# Patient Record
Sex: MALE | Race: WHITE | NOT HISPANIC OR LATINO | Employment: OTHER | URBAN - METROPOLITAN AREA
[De-identification: names, ages, dates, MRNs, and addresses within clinical notes are randomized per-mention and may not be internally consistent; named-entity substitution may affect disease eponyms.]

---

## 2023-11-10 ENCOUNTER — TELEPHONE (OUTPATIENT)
Age: 63
End: 2023-11-10

## 2023-11-10 NOTE — TELEPHONE ENCOUNTER
Nurse Ulloa from [facility], [address] called in today to establish care for transitional care pt Addisonrosa Marquez who is still admitted with the hospital. He is on blood thinning medicine and has big blood clot on his right leg. Did a warm transfer to office clerical and they took the call.

## 2023-11-16 ENCOUNTER — OFFICE VISIT (OUTPATIENT)
Dept: FAMILY MEDICINE CLINIC | Facility: CLINIC | Age: 63
End: 2023-11-16

## 2023-11-16 VITALS
HEIGHT: 70 IN | HEART RATE: 90 BPM | OXYGEN SATURATION: 98 % | SYSTOLIC BLOOD PRESSURE: 122 MMHG | BODY MASS INDEX: 27.92 KG/M2 | WEIGHT: 195 LBS | RESPIRATION RATE: 14 BRPM | TEMPERATURE: 98.3 F | DIASTOLIC BLOOD PRESSURE: 82 MMHG

## 2023-11-16 DIAGNOSIS — I73.9 PERIPHERAL ARTERY DISEASE (HCC): ICD-10-CM

## 2023-11-16 DIAGNOSIS — I10 ESSENTIAL HYPERTENSION: ICD-10-CM

## 2023-11-16 DIAGNOSIS — I82.411 ACUTE DEEP VEIN THROMBOSIS (DVT) OF FEMORAL VEIN OF RIGHT LOWER EXTREMITY (HCC): Primary | ICD-10-CM

## 2023-11-16 DIAGNOSIS — J44.89 CHRONIC BRONCHITIS WITH EMPHYSEMA: ICD-10-CM

## 2023-11-16 DIAGNOSIS — Z59.89 DOES NOT HAVE HEALTH INSURANCE: ICD-10-CM

## 2023-11-16 DIAGNOSIS — E03.8 SUBCLINICAL HYPOTHYROIDISM: ICD-10-CM

## 2023-11-16 DIAGNOSIS — Z86.79 HISTORY OF CEREBRAL ANEURYSM: ICD-10-CM

## 2023-11-16 DIAGNOSIS — I71.43 INFRARENAL ABDOMINAL AORTIC ANEURYSM (AAA) WITHOUT RUPTURE (HCC): ICD-10-CM

## 2023-11-16 PROCEDURE — 99213 OFFICE O/P EST LOW 20 MIN: CPT | Performed by: STUDENT IN AN ORGANIZED HEALTH CARE EDUCATION/TRAINING PROGRAM

## 2023-11-16 RX ORDER — AMLODIPINE BESYLATE 5 MG/1
5 TABLET ORAL DAILY
COMMUNITY
Start: 2023-11-12 | End: 2023-12-12

## 2023-11-16 SDOH — ECONOMIC STABILITY - INCOME SECURITY: OTHER PROBLEMS RELATED TO HOUSING AND ECONOMIC CIRCUMSTANCES: Z59.89

## 2023-11-16 NOTE — PROGRESS NOTES
Clinic Visit Note  Meena Solano 61 y.o. male   MRN: [id number]    Assessment and Plan      Diagnoses and all orders for this visit:    Acute deep vein thrombosis (DVT) of femoral vein of right lower extremity (HCC)  Continue Eliquis, patient does have enough Eliquis for the next 60 days    Subclinical hypothyroidism  We will recheck TSH/free T4 once patient has insurance    Chronic bronchitis with emphysema  Asymptomatic in office today, will most likely need PFTs in future, patient notes that he has stopped smoking for over 1 week now, encourage tobacco cessation    Essential hypertension  Blood pressure stable 122/82 on low-dose amlodipine, continue    Peripheral artery disease (HCC)  Anticoagulation, will need to check routine labs once patient has insurance to determine if statin therapy is appropriate    History of cerebral aneurysm  No focal neurological deficits    Infrarenal abdominal aortic aneurysm (AAA) without rupture ([address])  Referral to vascular surgery once patient obtains insurance    Does not have health insurance  -     Ambulatory Referral to Social Work Care Management Program; Future    Other orders  -     amLODIPine (NORVASC) 5 mg tablet; Take 5 mg by mouth daily  -     apixaban (ELIQUIS) 5 mg; Take 5 mg by mouth 2 (two) times a day    My impressions and treatment recommendations were discussed in detail with the patient who verbalized understanding and had no further questions. Discharge instructions were provided. Subjective     Chief Complaint: NP    History of Present Illness:    Patient is a pleasant 60-year-old male. Remote medical history of hypertension, positive smoking, positive drinking. Right lower leg DVT, appears to be provoked driving, smoking. IV heparin transition to Eliquis on discharge. Unfortunately no insurance. Fusiform infrarenal abdominal aortic aneurysm measuring 4.8 cm with significant mural thrombus, will need vascular surgery outpatient.   CT imaging also showed hepatic steatosis. Chest x-ray imaging showing signs of emphysema. Subclinical hypothyroidism with elevated TSH, normal free T4. Cerebral aneurysm s/p coiling in 2007. The following portions of the patient's history were reviewed and updated as appropriate: allergies, current medications, past family history, past medical history, past social history, past surgical history and problem list.    REVIEW OF SYSTEMS:  A complete 12-point review of systems is negative other than that noted in the HPI. Review of Systems   Constitutional:  Negative for chills, fatigue and fever. HENT:  Negative for congestion and sore throat. Eyes:  Negative for pain and visual disturbance. Respiratory:  Negative for shortness of breath and wheezing. Cardiovascular:  Negative for chest pain and palpitations. Gastrointestinal:  Negative for abdominal pain, constipation, diarrhea, nausea and vomiting. Genitourinary:  Negative for dysuria and frequency. Musculoskeletal:  Negative for back pain and neck pain. Skin:  Negative for color change and rash. Neurological:  Negative for dizziness and headaches. Psychiatric/Behavioral:  Negative for agitation and confusion. All other systems reviewed and are negative.         Current Outpatient Medications:   •  amLODIPine (NORVASC) 5 mg tablet, Take 5 mg by mouth daily, Disp: , Rfl:   •  apixaban (ELIQUIS) 5 mg, Take 5 mg by mouth 2 (two) times a day, Disp: , Rfl:   Past Medical History:   Diagnosis Date   • Hypertension      Past Surgical History:   Procedure Laterality Date   • APPENDECTOMY     • CEREBRAL ANEURYSM REPAIR     • HERNIA REPAIR       Social History     Socioeconomic History   • Marital status: Unknown     Spouse name: Not on file   • Number of children: Not on file   • Years of education: Not on file   • Highest education level: Not on file   Occupational History   • Not on file   Tobacco Use   • Smoking status: Former     Packs/day: 2.00 Types: Cigarettes   • Smokeless tobacco: Former   Vaping Use   • Vaping Use: Former   Substance and Sexual Activity   • Alcohol use: Yes     Comment: social   • Drug use: Never   • Sexual activity: Not on file   Other Topics Concern   • Not on file   Social History Narrative   • Not on file     Social Determinants of Health     Financial Resource Strain: Not on file   Food Insecurity: Not on file   Transportation Needs: Not on file   Physical Activity: Not on file   Stress: Not on file   Social Connections: Not on file   Intimate Partner Violence: Not on file   Housing Stability: Not on file     Family History   Problem Relation Age of Onset   • Heart disease Mother    • Heart disease Father      No Known Allergies    Objective     Vitals:    11/16/23 1528   BP: 122/82   BP Location: Left arm   Patient Position: Sitting   Cuff Size: Adult   Pulse: 90   Resp: 14   Temp: 98.3 °F (36.8 °C)   TempSrc: Temporal   SpO2: 98%   Weight: 88.5 kg (195 lb)   Height: 5' 10" (1.778 m)       Physical Exam:     GENERAL: NAD, pleasant   HEENT:  NC/AT, PERRL, EOMI, no scleral icterus  CARDIAC:  RRR, +S1/S2, no S3/S4 appreciated, no m/g/r  PULMONARY:  CTA B/L, no wheezing/rales/rhonci, non-labored breathing  ABDOMEN:  Soft, NT/ND, no rebound/guarding/rigidity  Extremities:. No edema, cyanosis, or clubbing  Musculoskeletal:  Full range of motion intact in all extremities   NEUROLOGIC: Grossly intact, no focal deficits  SKIN:  No rashes or erythema noted on exposed skin  Psych: Normal affect, mood stable    Right leg greater than left with compression stockings on today in office    ==  PLEASE NOTE:  This encounter was completed utilizing the [employer] Direct Speech Voice Recognition Software. Grammatical errors, random word insertions, pronoun errors and incomplete sentences are occasional consequences of the system due to software limitations, ambient noise and hardware issues. These may be missed by proof reading prior to affixing electronic signature. Any questions or concerns about the content, text or information contained within the body of this dictation should be directly addressed to the physician for clarification. Please do not hesitate to call me directly if you have any any questions or concerns.     DO Jagdeep Aguila [facility]   [facility]

## 2023-11-20 ENCOUNTER — PATIENT OUTREACH (OUTPATIENT)
Dept: FAMILY MEDICINE CLINIC | Facility: CLINIC | Age: 63
End: 2023-11-20

## 2023-11-20 NOTE — PROGRESS NOTES
TL CERVANTES received referral for patient due to lack of health insurance. TL CERVANTES reviewed patient's chart. He was prescribed Eliquis, however has no health insurance at this time and is running out of samples. TL CERVANTES reached out to patient's home phone of which is not currently a working phone number. TL CERVANTES reached out to patient's mobile number and left a voicemail.

## 2023-11-27 DIAGNOSIS — I82.411 ACUTE DEEP VEIN THROMBOSIS (DVT) OF FEMORAL VEIN OF RIGHT LOWER EXTREMITY (HCC): Primary | ICD-10-CM

## 2023-11-27 NOTE — TELEPHONE ENCOUNTER
Reason for call:   [x] Refill   [] Prior Auth  [] Other:     Office:   [x] PCP/Provider -   [] Specialty/Provider -      Medication :apixaban (ELIQUIS) 5 mg [de-identified]  Order Details  Dose: 5 mg Route: Oral Frequency: 2 times daily  Dispense Quantity: -- Refills: --        Sig: Take 5 mg by mouth 2 (two) times a day    Does the patient have enough for 3 days?    [x] Yes   [] No - Send as HP to POD       Pharmacy [address] [place] [address]

## 2023-11-28 ENCOUNTER — PATIENT OUTREACH (OUTPATIENT)
Dept: FAMILY MEDICINE CLINIC | Facility: CLINIC | Age: 63
End: 2023-11-28

## 2023-11-28 NOTE — PROGRESS NOTES
TL CERVANTES received voicemail from patient returning call to [facility]. Patient states that he was recently approved for Social Security, but has not received it just yet. His daughter applying for insurance on his behalf through [facility] [employer]. TL CERVANTES encouraged him to provide her with  contact information if she has any questions or concerns in the process. Patient also inquired about Eliquis. His daughter has the 30 day free trial card. TL CERVANTES placed the [facility] PAP along with the $10 copay card information for once he has insurance in the mail per his request.    Plan to f/u within 2 weeks to ensure mail is received.

## 2023-12-11 ENCOUNTER — TELEPHONE (OUTPATIENT)
Age: 63
End: 2023-12-11

## 2023-12-11 DIAGNOSIS — I10 ESSENTIAL HYPERTENSION: Primary | ICD-10-CM

## 2023-12-11 RX ORDER — AMLODIPINE BESYLATE 5 MG/1
5 TABLET ORAL DAILY
Qty: 30 TABLET | Refills: 0 | Status: SHIPPED | OUTPATIENT
Start: 2023-12-11 | End: 2024-01-10

## 2023-12-11 NOTE — TELEPHONE ENCOUNTER
Dr. Borja Addis:    Spoke w/patient's daughter patient did take 1 dose this morning and will have enough for 3 more doses of medication. I told her that we were going to work on it on our end. She said she left message for  from hospital to see if she can give them a coupon that is not .

## 2023-12-11 NOTE — TELEPHONE ENCOUNTER
Reason for call:   [x] Refill   [] Prior Auth  [] Other:     Office:   [x] PCP/Provider -   [] Specialty/Provider -     Medication: Norvasc    Dose/Frequency: 5 mg     Quantity:     Pharmacy: [facility]    Does the patient have enough for 3 days?    [] Yes   [x] No - Send as HP to POD

## 2023-12-11 NOTE — TELEPHONE ENCOUNTER
Pt is taking Eliquis for a DVT that was diagnosed in 11/2023. Pt was given a coupon card at the hospital so he could get the medication at a discounted rate, but the coupon is not working. Pt is out of his Eliquis as of today. Called office and spoke to Theodosia. Office does not give out samples and there are no coupon cards in the office. Pt's daughter aware. Recommended daughter try [facility] or attempt to contact the  at the hospital that originally gave pt the coupon card. Pt does not have a cardiologist that he can contact. Daughter will try these suggestions. Please make Dr. Michelle Carrasco aware that pt will be out of Eliquis as of today.

## 2023-12-12 NOTE — TELEPHONE ENCOUNTER
Patients daughter called for an update on Elliquis. I saw Dr. Marc Avila message in chart and transferred patients daughter Ubaldo Doran to [facility] 1330 clinical POD for update.

## 2023-12-12 NOTE — TELEPHONE ENCOUNTER
Patient daughter returned call. They did reach out to case management at the hospital, they cannot help.   And he does not have a cardiologist.  He does not have insurance either,

## 2023-12-27 ENCOUNTER — PATIENT OUTREACH (OUTPATIENT)
Dept: FAMILY MEDICINE CLINIC | Facility: CLINIC | Age: 63
End: 2023-12-27

## 2023-12-27 NOTE — PROGRESS NOTES
TL CERVANTES reached out to patient for f/u regarding [employer] PAP that as previously sent in the mail.  TL CERVANTES left a voicemail.  Plan to f/u in 1 week

## 2024-01-04 ENCOUNTER — PATIENT OUTREACH (OUTPATIENT)
Dept: FAMILY MEDICINE CLINIC | Facility: CLINIC | Age: 64
End: 2024-01-04

## 2024-01-04 NOTE — PROGRESS NOTES
OPCM SW attempted second outreach to ensure patient received his PAP.  Patient confirmed he received the application and will have his daughter assist.    No further need for SW assistance at this time per discussion, as his daughter is helping him with his insurance concerns.

## 2024-01-09 ENCOUNTER — OFFICE VISIT (OUTPATIENT)
Dept: FAMILY MEDICINE CLINIC | Facility: CLINIC | Age: 64
End: 2024-01-09

## 2024-01-09 VITALS
WEIGHT: 210 LBS | RESPIRATION RATE: 12 BRPM | DIASTOLIC BLOOD PRESSURE: 80 MMHG | OXYGEN SATURATION: 98 % | BODY MASS INDEX: 30.13 KG/M2 | HEART RATE: 88 BPM | TEMPERATURE: 98.4 F | SYSTOLIC BLOOD PRESSURE: 130 MMHG

## 2024-01-09 DIAGNOSIS — Z86.79 HISTORY OF CEREBRAL ANEURYSM: ICD-10-CM

## 2024-01-09 DIAGNOSIS — I73.9 PERIPHERAL ARTERY DISEASE (HCC): ICD-10-CM

## 2024-01-09 DIAGNOSIS — I82.411 ACUTE DEEP VEIN THROMBOSIS (DVT) OF FEMORAL VEIN OF RIGHT LOWER EXTREMITY (HCC): Primary | ICD-10-CM

## 2024-01-09 DIAGNOSIS — I10 ESSENTIAL HYPERTENSION: ICD-10-CM

## 2024-01-09 DIAGNOSIS — J44.89 CHRONIC BRONCHITIS WITH EMPHYSEMA: ICD-10-CM

## 2024-01-09 DIAGNOSIS — E03.8 SUBCLINICAL HYPOTHYROIDISM: ICD-10-CM

## 2024-01-09 DIAGNOSIS — I71.43 INFRARENAL ABDOMINAL AORTIC ANEURYSM (AAA) WITHOUT RUPTURE (HCC): ICD-10-CM

## 2024-01-09 PROCEDURE — 99213 OFFICE O/P EST LOW 20 MIN: CPT | Performed by: STUDENT IN AN ORGANIZED HEALTH CARE EDUCATION/TRAINING PROGRAM

## 2024-01-09 RX ORDER — AMLODIPINE BESYLATE 5 MG/1
5 TABLET ORAL DAILY
Qty: 90 TABLET | Refills: 1 | Status: SHIPPED | OUTPATIENT
Start: 2024-01-09 | End: 2024-07-07

## 2024-01-09 NOTE — PROGRESS NOTES
Clinic Visit Note  Erich Barcenas 63 y.o. male   MRN: [id number]    Assessment and Plan      Diagnoses and all orders for this visit:    Acute deep vein thrombosis (DVT) of femoral vein of right lower extremity (HCC)  Continue Eliquis anticoagulation, right lower extremity swelling improvement    Subclinical hypothyroidism  Recheck once patient has insurance    Chronic bronchitis with emphysema  Asymptomatic at this time, continue tobacco cessation    Essential hypertension  Blood pressure appropriate on amlodipine, refill  -     amLODIPine (NORVASC) 5 mg tablet; Take 1 tablet (5 mg total) by mouth daily    Peripheral artery disease (HCC)  Follow-up blood work once patient has insurance    Infrarenal abdominal aortic aneurysm (AAA) without rupture (HCC)  Referral to vascular surgery once patient obtains insurance    History of cerebral aneurysm  No focal neurological deficits on exam today    My impressions and treatment recommendations were discussed in detail with the patient who verbalized understanding and had no further questions.  Discharge instructions were provided.    Subjective     Chief Complaint: F/U    History of Present Illness:    Patient is a pleasant 63-year-old male coming in for follow-up visit on chronic disease management, right leg swelling improvement, ambulatory motion improving.    The following portions of the patient's history were reviewed and updated as appropriate: allergies, current medications, past family history, past medical history, past social history, past surgical history and problem list.    REVIEW OF SYSTEMS:  A complete 12-point review of systems is negative other than that noted in the HPI.    Review of Systems   Constitutional:  Negative for chills and fever.   HENT:  Negative for ear pain and sore throat.    Eyes:  Negative for pain and visual disturbance.   Respiratory:  Negative for cough and shortness of breath.    Cardiovascular:  Positive for leg swelling. Negative for  chest pain and palpitations.   Gastrointestinal:  Negative for abdominal pain and vomiting.   Genitourinary:  Negative for dysuria and hematuria.   Musculoskeletal:  Negative for arthralgias and back pain.   Skin:  Negative for color change and rash.   Neurological:  Negative for seizures and syncope.   All other systems reviewed and are negative.        Current Outpatient Medications:   •  amLODIPine (NORVASC) 5 mg tablet, Take 1 tablet (5 mg total) by mouth daily, Disp: 90 tablet, Rfl: 1  •  apixaban (ELIQUIS) 5 mg, Take 1 tablet (5 mg total) by mouth 2 (two) times a day, Disp: 60 tablet, Rfl: 5  Past Medical History:   Diagnosis Date   • Hypertension      Past Surgical History:   Procedure Laterality Date   • APPENDECTOMY     • CEREBRAL ANEURYSM REPAIR     • HERNIA REPAIR       Social History     Socioeconomic History   • Marital status: Unknown     Spouse name: Not on file   • Number of children: Not on file   • Years of education: Not on file   • Highest education level: Not on file   Occupational History   • Not on file   Tobacco Use   • Smoking status: Former     Current packs/day: 2.00     Types: Cigarettes   • Smokeless tobacco: Former   Vaping Use   • Vaping status: Former   Substance and Sexual Activity   • Alcohol use: Yes     Comment: social   • Drug use: Never   • Sexual activity: Not on file   Other Topics Concern   • Not on file   Social History Narrative   • Not on file     Social Determinants of Health     Financial Resource Strain: Not on file   Food Insecurity: Not on file   Transportation Needs: Not on file   Physical Activity: Not on file   Stress: Not on file   Social Connections: Not on file   Intimate Partner Violence: Not on file   Housing Stability: Not on file     Family History   Problem Relation Age of Onset   • Heart disease Mother    • Heart disease Father      No Known Allergies    Objective     Vitals:    01/09/24 1352   BP: 130/80   Pulse: 88   Resp: 12   Temp: 98.4 °F (36.9 °C)    SpO2: 98%   Weight: 95.3 kg (210 lb)       Physical Exam:     GENERAL: NAD, pleasant   HEENT:  NC/AT, PERRL, EOMI, no scleral icterus  CARDIAC:  RRR, +S1/S2, no S3/S4 appreciated, no m/g/r  PULMONARY:  CTA B/L, no wheezing/rales/rhonci, non-labored breathing  ABDOMEN:  Soft, NT/ND, no rebound/guarding/rigidity  Extremities:. No edema, cyanosis, or clubbing  Musculoskeletal:  Full range of motion intact in all extremities   NEUROLOGIC: Grossly intact, no focal deficits  SKIN:  No rashes or erythema noted on exposed skin  Psych: Normal affect, mood stable    Right leg swelling has now improved, sensation, vascular, muscle strength intact    ==  PLEASE NOTE:  This encounter was completed utilizing the [employer] Direct Speech Voice Recognition Software. Grammatical errors, random word insertions, pronoun errors and incomplete sentences are occasional consequences of the system due to software limitations, ambient noise and hardware issues.These may be missed by proof reading prior to affixing electronic signature. Any questions or concerns about the content, text or information contained within the body of this dictation should be directly addressed to the physician for clarification. Please do not hesitate to call me directly if you have any any questions or concerns.    James Queen, DO  [facility]   [facility]

## 2024-03-01 ENCOUNTER — OFFICE VISIT (OUTPATIENT)
Dept: FAMILY MEDICINE CLINIC | Facility: CLINIC | Age: 64
End: 2024-03-01
Payer: COMMERCIAL

## 2024-03-01 VITALS
BODY MASS INDEX: 31.21 KG/M2 | HEART RATE: 92 BPM | SYSTOLIC BLOOD PRESSURE: 134 MMHG | HEIGHT: 70 IN | TEMPERATURE: 98.6 F | DIASTOLIC BLOOD PRESSURE: 78 MMHG | RESPIRATION RATE: 14 BRPM | WEIGHT: 218 LBS | OXYGEN SATURATION: 96 %

## 2024-03-01 DIAGNOSIS — J44.89 CHRONIC BRONCHITIS WITH EMPHYSEMA: ICD-10-CM

## 2024-03-01 DIAGNOSIS — I10 ESSENTIAL HYPERTENSION: ICD-10-CM

## 2024-03-01 DIAGNOSIS — E87.8 ELECTROLYTE ABNORMALITY: ICD-10-CM

## 2024-03-01 DIAGNOSIS — Z86.79 HISTORY OF CEREBRAL ANEURYSM: ICD-10-CM

## 2024-03-01 DIAGNOSIS — Z13.0 SCREENING, IRON DEFICIENCY ANEMIA: ICD-10-CM

## 2024-03-01 DIAGNOSIS — Z13.220 SCREENING CHOLESTEROL LEVEL: ICD-10-CM

## 2024-03-01 DIAGNOSIS — Z12.5 PROSTATE CANCER SCREENING: ICD-10-CM

## 2024-03-01 DIAGNOSIS — E03.8 SUBCLINICAL HYPOTHYROIDISM: ICD-10-CM

## 2024-03-01 DIAGNOSIS — I73.9 PERIPHERAL ARTERY DISEASE (HCC): ICD-10-CM

## 2024-03-01 DIAGNOSIS — I71.43 INFRARENAL ABDOMINAL AORTIC ANEURYSM (AAA) WITHOUT RUPTURE (HCC): ICD-10-CM

## 2024-03-01 DIAGNOSIS — Z13.1 DIABETES MELLITUS SCREENING: ICD-10-CM

## 2024-03-01 DIAGNOSIS — I82.411 ACUTE DEEP VEIN THROMBOSIS (DVT) OF FEMORAL VEIN OF RIGHT LOWER EXTREMITY (HCC): Primary | ICD-10-CM

## 2024-03-01 PROCEDURE — 99214 OFFICE O/P EST MOD 30 MIN: CPT | Performed by: STUDENT IN AN ORGANIZED HEALTH CARE EDUCATION/TRAINING PROGRAM

## 2024-03-01 NOTE — PROGRESS NOTES
Clinic Visit Note  Erich Barcenas 63 y.o. male   MRN: [id number]    Assessment and Plan      Diagnoses and all orders for this visit:    Acute deep vein thrombosis (DVT) of femoral vein of right lower extremity (HCC)  Right lower extremity improvement on swelling, continue compression stockings, Eliquis 5 mg twice daily refilled to pharmacy.  -     apixaban (ELIQUIS) 5 mg; Take 1 tablet (5 mg total) by mouth 2 (two) times a day    Infrarenal abdominal aortic aneurysm (AAA) without rupture (HCC)  -     Ambulatory Referral to Vascular Surgery; Future    Essential hypertension  Blood pressure appropriate in office today, continue amlodipine    Peripheral artery disease (HCC)    History of cerebral aneurysm    Chronic bronchitis with emphysema  Lungs clear to auscultation bilaterally    Subclinical hypothyroidism  -     TSH, 3rd generation; Future  -     T4, free; Future  -     TSH, 3rd generation  -     T4, free    Prostate cancer screening  -     PSA, total and free; Future  -     PSA, total and free    Screening cholesterol level  -     Lipid panel; Future  -     Lipid panel    Screening, iron deficiency anemia  -     CBC and differential; Future  -     CBC and differential    Electrolyte abnormality  -     Comprehensive metabolic panel; Future  -     Comprehensive metabolic panel    Diabetes mellitus screening  -     Hemoglobin A1C; Future  -     Hemoglobin A1C    My impressions and treatment recommendations were discussed in detail with the patient who verbalized understanding and had no further questions.  Discharge instructions were provided.    Subjective     Chief Complaint: Follow-up visit    History of Present Illness:    Patient is a pleasant 63-year-old male coming in for follow-up visit on chronic disease management, patient now has obtained insurance and we will obtain yearly blood work.    The following portions of the patient's history were reviewed and updated as appropriate: allergies, current  medications, past family history, past medical history, past social history, past surgical history and problem list.    REVIEW OF SYSTEMS:  A complete 12-point review of systems is negative other than that noted in the HPI.    Review of Systems   Constitutional:  Negative for chills, fatigue and fever.   HENT:  Negative for congestion and sore throat.    Eyes:  Negative for pain and visual disturbance.   Respiratory:  Negative for shortness of breath and wheezing.    Cardiovascular:  Negative for chest pain and palpitations.   Gastrointestinal:  Negative for abdominal pain, constipation, diarrhea, nausea and vomiting.   Genitourinary:  Negative for dysuria and frequency.   Musculoskeletal:  Negative for back pain and neck pain.   Skin:  Negative for color change and rash.   Neurological:  Negative for dizziness and headaches.   Psychiatric/Behavioral:  Negative for agitation and confusion.    All other systems reviewed and are negative.        Current Outpatient Medications:   •  amLODIPine (NORVASC) 5 mg tablet, Take 1 tablet (5 mg total) by mouth daily, Disp: 90 tablet, Rfl: 1  •  apixaban (ELIQUIS) 5 mg, Take 1 tablet (5 mg total) by mouth 2 (two) times a day, Disp: 60 tablet, Rfl: 5  Past Medical History:   Diagnosis Date   • Hypertension      Past Surgical History:   Procedure Laterality Date   • APPENDECTOMY     • CEREBRAL ANEURYSM REPAIR     • HERNIA REPAIR       Social History     Socioeconomic History   • Marital status: Unknown     Spouse name: Not on file   • Number of children: Not on file   • Years of education: Not on file   • Highest education level: Not on file   Occupational History   • Not on file   Tobacco Use   • Smoking status: Former     Current packs/day: 2.00     Types: Cigarettes   • Smokeless tobacco: Former   Vaping Use   • Vaping status: Former   Substance and Sexual Activity   • Alcohol use: Yes     Comment: social   • Drug use: Never   • Sexual activity: Not on file   Other Topics  "Concern   • Not on file   Social History Narrative   • Not on file     Social Determinants of Health     Financial Resource Strain: Not on file   Food Insecurity: Not on file   Transportation Needs: Not on file   Physical Activity: Not on file   Stress: Not on file   Social Connections: Not on file   Intimate Partner Violence: Not on file   Housing Stability: Not on file     Family History   Problem Relation Age of Onset   • Heart disease Mother    • Heart disease Father      No Known Allergies    Objective     Vitals:    03/01/24 1313   BP: 134/78   BP Location: Left arm   Patient Position: Sitting   Cuff Size: Standard   Pulse: 92   Resp: 14   Temp: 98.6 °F (37 °C)   TempSrc: Temporal   SpO2: 96%   Weight: 98.9 kg (218 lb)   Height: 5' 10\" (1.778 m)       Physical Exam:     GENERAL: NAD, pleasant   HEENT:  NC/AT, PERRL, EOMI, no scleral icterus  CARDIAC:  RRR, +S1/S2, no S3/S4 appreciated, no m/g/r  PULMONARY:  CTA B/L, no wheezing/rales/rhonci, non-labored breathing  ABDOMEN:  Soft, NT/ND, no rebound/guarding/rigidity  Extremities:. No edema, cyanosis, or clubbing  Musculoskeletal:  Full range of motion intact in all extremities   NEUROLOGIC: Grossly intact, no focal deficits  SKIN:  No rashes or erythema noted on exposed skin  Psych: Normal affect, mood stable    ==  PLEASE NOTE:  This encounter was completed utilizing the [employer] Direct Speech Voice Recognition Software. Grammatical errors, random word insertions, pronoun errors and incomplete sentences are occasional consequences of the system due to software limitations, ambient noise and hardware issues.These may be missed by proof reading prior to affixing electronic signature. Any questions or concerns about the content, text or information contained within the body of this dictation should be directly addressed to the physician for clarification. Please do not hesitate to call me directly if you have any any questions or concerns.    James" DO Bret  [facility]   [facility]

## 2024-06-25 DIAGNOSIS — I10 ESSENTIAL HYPERTENSION: ICD-10-CM

## 2024-06-25 RX ORDER — AMLODIPINE BESYLATE 5 MG/1
5 TABLET ORAL DAILY
Qty: 90 TABLET | Refills: 0 | Status: SHIPPED | OUTPATIENT
Start: 2024-06-25

## 2024-09-28 ENCOUNTER — TELEPHONE (OUTPATIENT)
Dept: OTHER | Facility: OTHER | Age: 64
End: 2024-09-28

## 2024-09-28 NOTE — TELEPHONE ENCOUNTER
"Pt's daughter stated, \" I would like to know what the availability is so I can get my father scheduled for an appointment.\"    Please follow up,thank you.  "

## 2024-10-15 ENCOUNTER — OFFICE VISIT (OUTPATIENT)
Dept: FAMILY MEDICINE CLINIC | Facility: CLINIC | Age: 64
End: 2024-10-15
Payer: COMMERCIAL

## 2024-10-15 ENCOUNTER — TELEPHONE (OUTPATIENT)
Dept: OTHER | Facility: OTHER | Age: 64
End: 2024-10-15

## 2024-10-15 VITALS
WEIGHT: 234 LBS | HEIGHT: 70 IN | HEART RATE: 121 BPM | RESPIRATION RATE: 18 BRPM | OXYGEN SATURATION: 96 % | TEMPERATURE: 98.5 F | BODY MASS INDEX: 33.5 KG/M2 | DIASTOLIC BLOOD PRESSURE: 82 MMHG | SYSTOLIC BLOOD PRESSURE: 158 MMHG

## 2024-10-15 DIAGNOSIS — Z86.718 HISTORY OF DVT (DEEP VEIN THROMBOSIS): ICD-10-CM

## 2024-10-15 DIAGNOSIS — Z00.00 ANNUAL PHYSICAL EXAM: Primary | ICD-10-CM

## 2024-10-15 DIAGNOSIS — E03.8 SUBCLINICAL HYPOTHYROIDISM: ICD-10-CM

## 2024-10-15 DIAGNOSIS — I71.43 INFRARENAL ABDOMINAL AORTIC ANEURYSM (AAA) WITHOUT RUPTURE (HCC): ICD-10-CM

## 2024-10-15 DIAGNOSIS — Z86.79 HISTORY OF CEREBRAL ANEURYSM: ICD-10-CM

## 2024-10-15 DIAGNOSIS — I10 ESSENTIAL HYPERTENSION: ICD-10-CM

## 2024-10-15 DIAGNOSIS — J44.89 CHRONIC BRONCHITIS WITH EMPHYSEMA (HCC): ICD-10-CM

## 2024-10-15 DIAGNOSIS — I73.9 PERIPHERAL ARTERY DISEASE (HCC): ICD-10-CM

## 2024-10-15 PROCEDURE — 99173 VISUAL ACUITY SCREEN: CPT | Performed by: STUDENT IN AN ORGANIZED HEALTH CARE EDUCATION/TRAINING PROGRAM

## 2024-10-15 PROCEDURE — 99214 OFFICE O/P EST MOD 30 MIN: CPT | Performed by: STUDENT IN AN ORGANIZED HEALTH CARE EDUCATION/TRAINING PROGRAM

## 2024-10-15 PROCEDURE — 99396 PREV VISIT EST AGE 40-64: CPT | Performed by: STUDENT IN AN ORGANIZED HEALTH CARE EDUCATION/TRAINING PROGRAM

## 2024-10-15 RX ORDER — AMLODIPINE BESYLATE 5 MG/1
5 TABLET ORAL DAILY
Qty: 90 TABLET | Refills: 3 | Status: SHIPPED | OUTPATIENT
Start: 2024-10-15

## 2024-10-15 RX ORDER — LOSARTAN POTASSIUM 50 MG/1
50 TABLET ORAL DAILY
Qty: 90 TABLET | Refills: 3 | Status: SHIPPED | OUTPATIENT
Start: 2024-10-15

## 2024-10-15 NOTE — PATIENT INSTRUCTIONS
"Patient Education     Routine physical for adults   The Basics   Written by the doctors and editors at [facility]   What is a physical? -- A physical is a routine visit, or \"check-up,\" with your doctor. You might also hear it called a \"wellness visit\" or \"preventive visit.\"  During each visit, the doctor will:   Ask about your physical and mental health   Ask about your habits, behaviors, and lifestyle   Do an exam   Give you vaccines if needed   Talk to you about any medicines you take   Give advice about your health   Answer your questions  Getting regular check-ups is an important part of taking care of your health. It can help your doctor find and treat any problems you have. But it's also important for preventing health problems.  A routine physical is different from a \"sick visit.\" A sick visit is when you see a doctor because of a health concern or problem. Since physicals are scheduled ahead of time, you can think about what you want to ask the doctor.  How often should I get a physical? -- It depends on your age and health. In general, for people age 21 years and older:   If you are younger than 50 years, you might be able to get a physical every 3 years.   If you are 50 years or older, your doctor might recommend a physical every year.  If you have an ongoing health condition, like diabetes or high blood pressure, your doctor will probably want to see you more often.  What happens during a physical? -- In general, each visit will include:   Physical exam - The doctor or nurse will check your height, weight, heart rate, and blood pressure. They will also look at your eyes and ears. They will ask about how you are feeling and whether you have any symptoms that bother you.   Medicines - It's a good idea to bring a list of all the medicines you take to each doctor visit. Your doctor will talk to you about your medicines and answer any questions. Tell them if you are having any side effects that bother you. You " "should also tell them if you are having trouble paying for any of your medicines.   Habits and behaviors - This includes:   Your diet   Your exercise habits   Whether you smoke, drink alcohol, or use drugs   Whether you are sexually active   Whether you feel safe at home  Your doctor will talk to you about things you can do to improve your health and lower your risk of health problems. They will also offer help and support. For example, if you want to quit smoking, they can give you advice and might prescribe medicines. If you want to improve your diet or get more physical activity, they can help you with this, too.   Lab tests, if needed - The tests you get will depend on your age and situation. For example, your doctor might want to check your:   Cholesterol   Blood sugar   Iron level   Vaccines - The recommended vaccines will depend on your age, health, and what vaccines you already had. Vaccines are very important because they can prevent certain serious or deadly infections.   Discussion of screening - \"Screening\" means checking for diseases or other health problems before they cause symptoms. Your doctor can recommend screening based on your age, risk, and preferences. This might include tests to check for:   Cancer, such as breast, prostate, cervical, ovarian, colorectal, prostate, lung, or skin cancer   Sexually transmitted infections, such as chlamydia and gonorrhea   Mental health conditions like depression and anxiety  Your doctor will talk to you about the different types of screening tests. They can help you decide which screenings to have. They can also explain what the results might mean.   Answering questions - The physical is a good time to ask the doctor or nurse questions about your health. If needed, they can refer you to other doctors or specialists, too.  Adults older than 65 years often need other care, too. As you get older, your doctor will talk to you about:   How to prevent falling at " home   Hearing or vision tests   Memory testing   How to take your medicines safely   Making sure that you have the help and support you need at home  All topics are updated as new evidence becomes available and our peer review process is complete.  This topic retrieved from [employer] on: May 02, 2024.  Topic 989763 Version 1.0  Release: 32.4.3 - C32.122  © 2024 UpToDate, Inc. and/or its affiliates. All rights reserved.  Consumer Information Use and Disclaimer   Disclaimer: This generalized information is a limited summary of diagnosis, treatment, and/or medication information. It is not meant to be comprehensive and should be used as a tool to help the user understand and/or assess potential diagnostic and treatment options. It does NOT include all information about conditions, treatments, medications, side effects, or risks that may apply to a specific patient. It is not intended to be medical advice or a substitute for the medical advice, diagnosis, or treatment of a health care provider based on the health care provider's examination and assessment of a patient's specific and unique circumstances. Patients must speak with a health care provider for complete information about their health, medical questions, and treatment options, including any risks or benefits regarding use of medications. This information does not endorse any treatments or medications as safe, effective, or approved for treating a specific patient. UpToDate, Inc. and its affiliates disclaim any warranty or liability relating to this information or the use thereof.The use of this information is governed by the Terms of Use, available at https://www.woltersSecurActiveuwer.com/en/know/clinical-effectiveness-terms. 2024© UpToDate, Inc. and its affiliates and/or licensors. All rights reserved.  Copyright   © 2024 UpToDate, Inc. and/or its affiliates. All rights reserved.

## 2024-10-15 NOTE — PROGRESS NOTES
Adult Annual Physical  Name: Erich Barcenas      : 1960      MRN: [id number]  Encounter Provider: James Queen DO  Encounter Date: 10/15/2024   Encounter department: [facility]    Assessment & Plan  Essential hypertension    Orders:    amLODIPine (NORVASC) 5 mg tablet; Take 1 tablet (5 mg total) by mouth daily    Infrarenal abdominal aortic aneurysm (AAA) without rupture (HCC)         Peripheral artery disease (HCC)         Annual physical exam         Chronic bronchitis with emphysema (HCC)         Subclinical hypothyroidism         History of cerebral aneurysm         History of DVT (deep vein thrombosis)         Patient is a pleasant 63-year-old male coming in for follow-up chronic disease management, annual physical exam.  Patient is feeling well, seborrheic dermatitis face, recommend appropriate washing with moisture barrier.  Recommend increasing blood pressure medication to amlodipine/lisinopril, reevaluate for goal 140/90 and below.  Continue Eliquis at this time, follow-up with vascular surgery given abdominal aortic aneurysm.  Patient will follow-up with yearly blood work ordered previously.    Immunizations and preventive care screenings were discussed with patient today. Appropriate education was printed on patient's after visit summary.    Discussed risks and benefits of prostate cancer screening. We discussed the controversial history of PSA screening for prostate cancer in the [place] as well as the risk of over detection and over treatment of prostate cancer by way of PSA screening.  The patient understands that PSA blood testing is an imperfect way to screen for prostate cancer and that elevated PSA levels in the blood may also be caused by infection, inflammation, prostatic trauma or manipulation, urological procedures, or by benign prostatic enlargement.    The role of the digital rectal examination in prostate cancer screening was also discussed and I  discussed with him that there is large interobserver variability in the findings of digital rectal examination.    Counseling:  Alcohol/drug use: discussed moderation in alcohol intake, the recommendations for healthy alcohol use, and avoidance of illicit drug use.  Dental Health: discussed importance of regular tooth brushing, flossing, and dental visits.  Injury prevention: discussed safety/seat belts, safety helmets, smoke detectors, carbon monoxide detectors, and smoking near bedding or upholstery.  Sexual health: discussed sexually transmitted diseases, partner selection, use of condoms, avoidance of unintended pregnancy, and contraceptive alternatives.  Exercise: the importance of regular exercise/physical activity was discussed. Recommend exercise 3-5 times per week for at least 30 minutes.       Depression Screening and Follow-up Plan: Patient was screened for depression during today's encounter. They screened negative with a PHQ-2 score of 2.      History of Present Illness     Adult Annual Physical:  Patient presents for annual physical.     Diet and Physical Activity:  - Diet/Nutrition: well balanced diet.  - Exercise: 1-2 times a week on average.    Depression Screening:  - PHQ-2 Score: 2    General Health:  - Sleep: sleeps well.  - Hearing: normal hearing bilateral ears.  - Vision: no vision problems.  - Dental: regular dental visits.     Health:  - History of STDs: no.   - Urinary symptoms: none.     Review of Systems   Constitutional:  Negative for chills and fever.   HENT:  Negative for ear pain and sore throat.    Eyes:  Negative for pain and visual disturbance.   Respiratory:  Negative for cough and shortness of breath.    Cardiovascular:  Negative for chest pain and palpitations.   Gastrointestinal:  Negative for abdominal pain and vomiting.   Genitourinary:  Negative for dysuria and hematuria.   Musculoskeletal:  Negative for arthralgias and back pain.   Skin:  Negative for color change and  "rash.   Neurological:  Negative for seizures and syncope.   All other systems reviewed and are negative.        Objective     /82 (BP Location: Left arm, Patient Position: Sitting, Cuff Size: Large)   Pulse (!) 121   Temp 98.5 °F (36.9 °C)   Resp 18   Ht 5' 10\" (1.778 m)   Wt 106 kg (234 lb)   SpO2 96%   BMI 33.58 kg/m²     Physical Exam  Vitals and nursing note reviewed.   Constitutional:       General: He is not in acute distress.     Appearance: He is well-developed.   HENT:      Head: Normocephalic and atraumatic.   Eyes:      General: No scleral icterus.     Conjunctiva/sclera: Conjunctivae normal.   Cardiovascular:      Rate and Rhythm: Normal rate and regular rhythm.      Pulses: Normal pulses.      Heart sounds: No murmur heard.  Pulmonary:      Effort: Pulmonary effort is normal. No respiratory distress.      Breath sounds: Normal breath sounds.   Abdominal:      General: Bowel sounds are normal. There is no distension.      Palpations: Abdomen is soft.      Tenderness: There is no abdominal tenderness.   Musculoskeletal:         General: No swelling. Normal range of motion.      Cervical back: Neck supple.   Skin:     General: Skin is warm and dry.      Capillary Refill: Capillary refill takes less than 2 seconds.   Neurological:      General: No focal deficit present.      Mental Status: He is alert and oriented to person, place, and time. Mental status is at baseline.   Psychiatric:         Mood and Affect: Mood normal.         Behavior: Behavior normal.       "

## 2024-10-16 NOTE — TELEPHONE ENCOUNTER
Called patient and left message to call the office to schedule an office visit.  Office phone number provided.

## 2024-10-16 NOTE — TELEPHONE ENCOUNTER
Patient daughter is calling and would like to schedule an appointment with [facility]. Please follow up and schedule. Per patient daughter please leave a voice message if not available.Thank you in advance.

## 2024-10-23 ENCOUNTER — TELEPHONE (OUTPATIENT)
Age: 64
End: 2024-10-23

## 2024-10-23 LAB
ALBUMIN SERPL-MCNC: 4.6 G/DL (ref 3.9–4.9)
ALP SERPL-CCNC: 80 IU/L (ref 44–121)
ALT SERPL-CCNC: 80 IU/L (ref 0–44)
AST SERPL-CCNC: 59 IU/L (ref 0–40)
BASOPHILS # BLD AUTO: 0.1 X10E3/UL (ref 0–0.2)
BASOPHILS NFR BLD AUTO: 1 %
BILIRUB SERPL-MCNC: 0.7 MG/DL (ref 0–1.2)
BUN SERPL-MCNC: 12 MG/DL (ref 8–27)
BUN/CREAT SERPL: 17 (ref 10–24)
CALCIUM SERPL-MCNC: 9.8 MG/DL (ref 8.6–10.2)
CHLORIDE SERPL-SCNC: 98 MMOL/L (ref 96–106)
CHOLEST SERPL-MCNC: 259 MG/DL (ref 100–199)
CHOLEST/HDLC SERPL: 3.3 RATIO (ref 0–5)
CO2 SERPL-SCNC: 24 MMOL/L (ref 20–29)
CREAT SERPL-MCNC: 0.69 MG/DL (ref 0.76–1.27)
EGFR: 103 ML/MIN/1.73
EOSINOPHIL # BLD AUTO: 0.1 X10E3/UL (ref 0–0.4)
EOSINOPHIL NFR BLD AUTO: 1 %
ERYTHROCYTE [DISTWIDTH] IN BLOOD BY AUTOMATED COUNT: 13.9 % (ref 11.6–15.4)
EST. AVERAGE GLUCOSE BLD GHB EST-MCNC: 111 MG/DL
GLOBULIN SER-MCNC: 2.9 G/DL (ref 1.5–4.5)
GLUCOSE SERPL-MCNC: 94 MG/DL (ref 70–99)
HBA1C MFR BLD: 5.5 % (ref 4.8–5.6)
HCT VFR BLD AUTO: 51 % (ref 37.5–51)
HDLC SERPL-MCNC: 79 MG/DL
HGB BLD-MCNC: 17.5 G/DL (ref 13–17.7)
IMM GRANULOCYTES # BLD: 0 X10E3/UL (ref 0–0.1)
IMM GRANULOCYTES NFR BLD: 1 %
LDLC SERPL CALC-MCNC: 166 MG/DL (ref 0–99)
LYMPHOCYTES # BLD AUTO: 2.3 X10E3/UL (ref 0.7–3.1)
LYMPHOCYTES NFR BLD AUTO: 29 %
MCH RBC QN AUTO: 32.4 PG (ref 26.6–33)
MCHC RBC AUTO-ENTMCNC: 34.3 G/DL (ref 31.5–35.7)
MCV RBC AUTO: 94 FL (ref 79–97)
MICRODELETION SYND BLD/T FISH: NORMAL
MONOCYTES # BLD AUTO: 0.7 X10E3/UL (ref 0.1–0.9)
MONOCYTES NFR BLD AUTO: 9 %
NEUTROPHILS # BLD AUTO: 4.8 X10E3/UL (ref 1.4–7)
NEUTROPHILS NFR BLD AUTO: 59 %
PLATELET # BLD AUTO: 218 X10E3/UL (ref 150–450)
POTASSIUM SERPL-SCNC: 4.8 MMOL/L (ref 3.5–5.2)
PROT SERPL-MCNC: 7.5 G/DL (ref 6–8.5)
PSA FREE MFR SERPL: 20 %
PSA FREE SERPL-MCNC: 2.06 NG/ML
PSA SERPL-MCNC: 10.3 NG/ML (ref 0–4)
RBC # BLD AUTO: 5.4 X10E6/UL (ref 4.14–5.8)
SL AMB VLDL CHOLESTEROL CALC: 14 MG/DL (ref 5–40)
SODIUM SERPL-SCNC: 139 MMOL/L (ref 134–144)
T4 FREE SERPL-MCNC: 1.16 NG/DL (ref 0.82–1.77)
TRIGL SERPL-MCNC: 83 MG/DL (ref 0–149)
TSH SERPL DL<=0.005 MIU/L-ACNC: 4.14 UIU/ML (ref 0.45–4.5)
WBC # BLD AUTO: 8 X10E3/UL (ref 3.4–10.8)

## 2024-10-23 NOTE — TELEPHONE ENCOUNTER
Patient's daughter called requesting PCP order US or CT of abdomin. She states vascular won't schedule an appointment for him until this testing is done from previous triple aortic aneurysm.

## 2024-10-24 DIAGNOSIS — I71.43 INFRARENAL ABDOMINAL AORTIC ANEURYSM (AAA) WITHOUT RUPTURE (HCC): Primary | ICD-10-CM

## 2024-10-28 ENCOUNTER — TELEPHONE (OUTPATIENT)
Dept: FAMILY MEDICINE CLINIC | Facility: CLINIC | Age: 64
End: 2024-10-28

## 2024-10-28 DIAGNOSIS — R97.20 ELEVATED PSA: Primary | ICD-10-CM

## 2024-10-28 NOTE — TELEPHONE ENCOUNTER
----- Message from James Queen DO sent at 10/28/2024 11:09 AM EDT -----  Please schedule virtual or in office appointment to discuss blood work, will have patient follow-up with urology.

## 2024-11-07 ENCOUNTER — HOSPITAL ENCOUNTER (OUTPATIENT)
Dept: RADIOLOGY | Facility: HOSPITAL | Age: 64
Discharge: HOME | End: 2024-11-07
Attending: STUDENT IN AN ORGANIZED HEALTH CARE EDUCATION/TRAINING PROGRAM
Payer: COMMERCIAL

## 2024-11-07 DIAGNOSIS — I71.43 INFRARENAL ABDOMINAL AORTIC ANEURYSM (AAA) WITHOUT RUPTURE (HCC): ICD-10-CM

## 2024-11-07 PROCEDURE — 74176 CT ABD & PELVIS W/O CONTRAST: CPT

## 2024-11-13 ENCOUNTER — TELEPHONE (OUTPATIENT)
Age: 64
End: 2024-11-13

## 2024-11-13 NOTE — TELEPHONE ENCOUNTER
Results - Deny Scheduling   Denial Instructions   Send to nurse triage.         [facility] if possible   Please call  pt daughter to schedule  at   New Patient Triage  Please Triage:   New Patient-   What is the reason for the patients appointment?  Elevated psa  Imaging/Lab Results:   Psa= 10.3  Insurance:   Do we accept the pt's insurance or is the patient Self-Pay?  Provider & Plan:  maggy AGGARWAL BS   Member ID#: [id number]     History  Has the pt had any previous urologist? No     Have pt records been requested? No    Has the pt had any outside testing done? no    Does the pt have a personal history of cancer?: no

## 2024-11-14 ENCOUNTER — RESULTS FOLLOW-UP (OUTPATIENT)
Dept: FAMILY MEDICINE CLINIC | Facility: CLINIC | Age: 64
End: 2024-11-14

## 2024-11-14 NOTE — TELEPHONE ENCOUNTER
----- Message from James Queen DO sent at 11/14/2024 11:12 AM EST -----  Please set up virtual visit or in office Friday or early next week.

## 2024-11-15 ENCOUNTER — OFFICE VISIT (OUTPATIENT)
Dept: FAMILY MEDICINE CLINIC | Facility: CLINIC | Age: 64
End: 2024-11-15
Payer: COMMERCIAL

## 2024-11-15 VITALS
OXYGEN SATURATION: 98 % | TEMPERATURE: 98.6 F | BODY MASS INDEX: 33.07 KG/M2 | HEART RATE: 105 BPM | DIASTOLIC BLOOD PRESSURE: 82 MMHG | RESPIRATION RATE: 12 BRPM | HEIGHT: 70 IN | SYSTOLIC BLOOD PRESSURE: 134 MMHG | WEIGHT: 231 LBS

## 2024-11-15 DIAGNOSIS — I73.9 PERIPHERAL ARTERY DISEASE (HCC): ICD-10-CM

## 2024-11-15 DIAGNOSIS — J44.89 CHRONIC BRONCHITIS WITH EMPHYSEMA (HCC): ICD-10-CM

## 2024-11-15 DIAGNOSIS — Z86.718 HISTORY OF DVT (DEEP VEIN THROMBOSIS): ICD-10-CM

## 2024-11-15 DIAGNOSIS — Z86.79 HISTORY OF CEREBRAL ANEURYSM: ICD-10-CM

## 2024-11-15 DIAGNOSIS — R79.89 ABNORMAL LFTS: ICD-10-CM

## 2024-11-15 DIAGNOSIS — F10.10 ALCOHOL ABUSE: ICD-10-CM

## 2024-11-15 DIAGNOSIS — E03.8 SUBCLINICAL HYPOTHYROIDISM: ICD-10-CM

## 2024-11-15 DIAGNOSIS — I82.411 ACUTE DEEP VEIN THROMBOSIS (DVT) OF FEMORAL VEIN OF RIGHT LOWER EXTREMITY (HCC): ICD-10-CM

## 2024-11-15 DIAGNOSIS — R97.20 ELEVATED PSA: ICD-10-CM

## 2024-11-15 DIAGNOSIS — I71.43 INFRARENAL ABDOMINAL AORTIC ANEURYSM (AAA) WITHOUT RUPTURE (HCC): Primary | ICD-10-CM

## 2024-11-15 DIAGNOSIS — I10 ESSENTIAL HYPERTENSION: ICD-10-CM

## 2024-11-15 DIAGNOSIS — L21.9 SEBORRHEIC DERMATITIS: ICD-10-CM

## 2024-11-15 PROCEDURE — 99214 OFFICE O/P EST MOD 30 MIN: CPT | Performed by: STUDENT IN AN ORGANIZED HEALTH CARE EDUCATION/TRAINING PROGRAM

## 2024-11-15 NOTE — PROGRESS NOTES
Clinic Visit Note  Erich Barcenas 64 y.o. male   MRN: [id number]    Assessment and Plan      Diagnoses and all orders for this visit:    Infrarenal abdominal aortic aneurysm (AAA) without rupture (HCC)    Essential hypertension    Peripheral artery disease (HCC)    Chronic bronchitis with emphysema (HCC)    Subclinical hypothyroidism    History of cerebral aneurysm    History of DVT (deep vein thrombosis)    Abnormal LFTs  -     Comprehensive metabolic panel; Future    Alcohol abuse    Acute deep vein thrombosis (DVT) of femoral vein of right lower extremity (HCC)    Elevated PSA    Seborrheic dermatitis      Patient is a pleasant 64-year-old male coming in for follow-up lab work/imaging.    Abnormal LFTs, alcohol abuse, we discussed decreasing alcohol use to a goal of abstinence, repeat LFTs, marked hepatic steatosis on imaging.    Continue Eliquis and antihypertensive medication, blood pressure well-controlled.    Mixed hyperlipidemia, recommend Crestor 20 mg after repeat CMP.    Abdominal aortic aneurysm, measuring 5.2 cm, recently 4.8 cm out of network imaging, will discuss with thoracic surgery team appropriate management.    Elevated PSA of greater than 10, prostamegaly noted on imaging, 5 mm stone urinary bladder lumen, patient has appropriate follow-up with urology, CT imaging showing no acute findings of abdomen/pelvis, patient notes minimal urinary symptoms, sometimes frequency.    Discussed with patient and daughter over phone labs/imaging thoroughly, we discussed continue to work on optimizing medications, appropriate specialty follow-up, patient in agreement but is very hesitant about health going forward, reassurance given.    My impressions and treatment recommendations were discussed in detail with the patient who verbalized understanding and had no further questions.  Discharge instructions were provided.    Subjective     Chief Complaint: F/U    History of Present Illness:    Follow-up blood work and  imaging.    The following portions of the patient's history were reviewed and updated as appropriate: allergies, current medications, past family history, past medical history, past social history, past surgical history and problem list.    REVIEW OF SYSTEMS:  A complete 12-point review of systems is negative other than that noted in the HPI.    Review of Systems   Constitutional:  Negative for chills, fatigue and fever.   HENT:  Negative for congestion and sore throat.    Eyes:  Negative for pain and visual disturbance.   Respiratory:  Negative for shortness of breath and wheezing.    Cardiovascular:  Negative for chest pain and palpitations.   Gastrointestinal:  Negative for abdominal pain, constipation, diarrhea, nausea and vomiting.   Genitourinary:  Negative for dysuria and frequency.   Musculoskeletal:  Negative for back pain and neck pain.   Skin:  Positive for rash. Negative for color change.   Neurological:  Negative for dizziness and headaches.   Psychiatric/Behavioral:  Negative for agitation and confusion.    All other systems reviewed and are negative.        Current Outpatient Medications:     amLODIPine (NORVASC) 5 mg tablet, Take 1 tablet (5 mg total) by mouth daily, Disp: 90 tablet, Rfl: 3    apixaban (ELIQUIS) 5 mg, Take 1 tablet (5 mg total) by mouth 2 (two) times a day, Disp: 60 tablet, Rfl: 5    losartan (COZAAR) 50 mg tablet, Take 1 tablet (50 mg total) by mouth daily, Disp: 90 tablet, Rfl: 3  Past Medical History:   Diagnosis Date    Hypertension      Past Surgical History:   Procedure Laterality Date    APPENDECTOMY      CEREBRAL ANEURYSM REPAIR      HERNIA REPAIR       Social History     Socioeconomic History    Marital status: Unknown     Spouse name: Not on file    Number of children: Not on file    Years of education: Not on file    Highest education level: Not on file   Occupational History    Not on file   Tobacco Use    Smoking status: Former     Current packs/day: 2.00     Types:  "Cigarettes    Smokeless tobacco: Former   Vaping Use    Vaping status: Former   Substance and Sexual Activity    Alcohol use: Yes     Comment: social    Drug use: Never    Sexual activity: Not on file   Other Topics Concern    Not on file   Social History Narrative    Not on file     Social Drivers of Health     Financial Resource Strain: Not on file   Food Insecurity: Not on file   Transportation Needs: Not on file   Physical Activity: Not on file   Stress: Not on file   Social Connections: Not on file   Intimate Partner Violence: Not on file   Housing Stability: Not on file     Family History   Problem Relation Age of Onset    Heart disease Mother     Heart disease Father      No Known Allergies    Objective     Vitals:    11/15/24 1412   BP: 134/82   BP Location: Left arm   Patient Position: Sitting   Cuff Size: Large   Pulse: 105   Resp: 12   Temp: 98.6 °F (37 °C)   TempSrc: Temporal   SpO2: 98%   Weight: 105 kg (231 lb)   Height: 5' 10\" (1.778 m)       Physical Exam:     GENERAL: NAD, pleasant   HEENT:  NC/AT, PERRL, EOMI, no scleral icterus  CARDIAC:  RRR, +S1/S2, no S3/S4 appreciated, no m/g/r  PULMONARY:  CTA B/L, no wheezing/rales/rhonci, non-labored breathing  ABDOMEN:  Soft, NT/ND, no rebound/guarding/rigidity  Extremities:. No edema, cyanosis, or clubbing  Musculoskeletal:  Full range of motion intact in all extremities   NEUROLOGIC: Grossly intact, no focal deficits  SKIN:  No rashes or erythema noted on exposed skin  Psych: Normal affect, mood stable    ==  PLEASE NOTE:  This encounter was completed utilizing the [employer] Direct Speech Voice Recognition Software. Grammatical errors, random word insertions, pronoun errors and incomplete sentences are occasional consequences of the system due to software limitations, ambient noise and hardware issues.These may be missed by proof reading prior to affixing electronic signature. Any questions or concerns about the content, text or information " contained within the body of this dictation should be directly addressed to the physician for clarification. Please do not hesitate to call me directly if you have any any questions or concerns.    James Queen DO  [facility] Internal Medicine   [facility]

## 2025-03-04 ENCOUNTER — APPOINTMENT (EMERGENCY)
Dept: RADIOLOGY | Facility: HOSPITAL | Age: 65
End: 2025-03-04
Payer: COMMERCIAL

## 2025-03-04 ENCOUNTER — HOSPITAL ENCOUNTER (OUTPATIENT)
Facility: HOSPITAL | Age: 65
Setting detail: OBSERVATION
Discharge: OTHER INSTITUTION | End: 2025-03-05
Payer: COMMERCIAL

## 2025-03-04 DIAGNOSIS — H05.239 PERIORBITAL HEMATOMA: ICD-10-CM

## 2025-03-04 DIAGNOSIS — R62.7 FAILURE TO THRIVE IN ADULT: ICD-10-CM
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
## 2025-03-04 DIAGNOSIS — F10.10 ALCOHOL ABUSE: ICD-10-CM

## 2025-03-04 PROBLEM — R62.51 FAILURE TO THRIVE (CHILD): Status: ACTIVE | Noted: 2025-03-04

## 2025-03-04 PROBLEM — S00.83XA FACIAL HEMATOMA: Status: ACTIVE | Noted: 2025-03-04

## 2025-03-04 LAB
ALBUMIN SERPL BCG-MCNC: 3.8 G/DL (ref 3.5–5)
ALBUMIN SERPL BCG-MCNC: 4.2 G/DL (ref 3.5–5)
ALP SERPL-CCNC: 93 U/L (ref 34–104)
ALP SERPL-CCNC: 96 U/L (ref 34–104)
ALT SERPL W P-5'-P-CCNC: 75 U/L (ref 7–52)
ALT SERPL W P-5'-P-CCNC: 88 U/L (ref 7–52)
ANION GAP SERPL CALCULATED.3IONS-SCNC: 14 MMOL/L (ref 4–13)
ANION GAP SERPL CALCULATED.3IONS-SCNC: 15 MMOL/L (ref 4–13)
APTT PPP: 24 SECONDS (ref 23–34)
AST SERPL W P-5'-P-CCNC: 110 U/L (ref 13–39)
AST SERPL W P-5'-P-CCNC: 73 U/L (ref 13–39)
BASOPHILS # BLD AUTO: 0.03 THOUSANDS/ÂΜL (ref 0–0.1)
BASOPHILS NFR BLD AUTO: 0 % (ref 0–1)
BILIRUB SERPL-MCNC: 0.91 MG/DL (ref 0.2–1)
BILIRUB SERPL-MCNC: 0.93 MG/DL (ref 0.2–1)
BUN SERPL-MCNC: 7 MG/DL (ref 5–25)
BUN SERPL-MCNC: 8 MG/DL (ref 5–25)
CALCIUM SERPL-MCNC: 8.6 MG/DL (ref 8.4–10.2)
CALCIUM SERPL-MCNC: 8.7 MG/DL (ref 8.4–10.2)
CHLORIDE SERPL-SCNC: 102 MMOL/L (ref 96–108)
CHLORIDE SERPL-SCNC: 102 MMOL/L (ref 96–108)
CO2 SERPL-SCNC: 20 MMOL/L (ref 21–32)
CO2 SERPL-SCNC: 21 MMOL/L (ref 21–32)
CREAT SERPL-MCNC: 0.56 MG/DL (ref 0.6–1.3)
CREAT SERPL-MCNC: 0.64 MG/DL (ref 0.6–1.3)
EOSINOPHIL # BLD AUTO: 0.04 THOUSAND/ÂΜL (ref 0–0.61)
EOSINOPHIL NFR BLD AUTO: 1 % (ref 0–6)
ERYTHROCYTE [DISTWIDTH] IN BLOOD BY AUTOMATED COUNT: 13.4 % (ref 11.6–15.1)
ETHANOL SERPL-MCNC: 15 MG/DL
GFR SERPL CREATININE-BSD FRML MDRD: 103 ML/MIN/1.73SQ M
GFR SERPL CREATININE-BSD FRML MDRD: 109 ML/MIN/1.73SQ M
GLUCOSE SERPL-MCNC: 121 MG/DL (ref 65–140)
GLUCOSE SERPL-MCNC: 127 MG/DL (ref 65–140)
HCT VFR BLD AUTO: 50.1 % (ref 36.5–49.3)
HGB BLD-MCNC: 17.4 G/DL (ref 12–17)
IMM GRANULOCYTES # BLD AUTO: 0.02 THOUSAND/UL (ref 0–0.2)
IMM GRANULOCYTES NFR BLD AUTO: 0 % (ref 0–2)
INR PPP: 0.93 (ref 0.85–1.19)
LIPASE SERPL-CCNC: 26 U/L (ref 11–82)
LYMPHOCYTES # BLD AUTO: 2.83 THOUSANDS/ÂΜL (ref 0.6–4.47)
LYMPHOCYTES NFR BLD AUTO: 35 % (ref 14–44)
MCH RBC QN AUTO: 33.8 PG (ref 26.8–34.3)
MCHC RBC AUTO-ENTMCNC: 34.7 G/DL (ref 31.4–37.4)
MCV RBC AUTO: 97 FL (ref 82–98)
MONOCYTES # BLD AUTO: 0.47 THOUSAND/ÂΜL (ref 0.17–1.22)
MONOCYTES NFR BLD AUTO: 6 % (ref 4–12)
NEUTROPHILS # BLD AUTO: 4.75 THOUSANDS/ÂΜL (ref 1.85–7.62)
NEUTS SEG NFR BLD AUTO: 58 % (ref 43–75)
NRBC BLD AUTO-RTO: 0 /100 WBCS
PHOSPHATE SERPL-MCNC: 1.7 MG/DL (ref 2.3–4.1)
PLATELET # BLD AUTO: 206 THOUSANDS/UL (ref 149–390)
PMV BLD AUTO: 9.9 FL (ref 8.9–12.7)
POTASSIUM SERPL-SCNC: 3.5 MMOL/L (ref 3.5–5.3)
POTASSIUM SERPL-SCNC: 5.5 MMOL/L (ref 3.5–5.3)
PROT SERPL-MCNC: 6.9 G/DL (ref 6.4–8.4)
PROT SERPL-MCNC: 7.8 G/DL (ref 6.4–8.4)
PROTHROMBIN TIME: 13 SECONDS (ref 12.3–15)
RBC # BLD AUTO: 5.15 MILLION/UL (ref 3.88–5.62)
SODIUM SERPL-SCNC: 136 MMOL/L (ref 135–147)
SODIUM SERPL-SCNC: 138 MMOL/L (ref 135–147)
WBC # BLD AUTO: 8.14 THOUSAND/UL (ref 4.31–10.16)

## 2025-03-04 PROCEDURE — 70450 CT HEAD/BRAIN W/O DYE: CPT

## 2025-03-04 PROCEDURE — 84100 ASSAY OF PHOSPHORUS: CPT

## 2025-03-04 PROCEDURE — 85025 COMPLETE CBC W/AUTO DIFF WBC: CPT

## 2025-03-04 PROCEDURE — 70486 CT MAXILLOFACIAL W/O DYE: CPT

## 2025-03-04 PROCEDURE — 93005 ELECTROCARDIOGRAM TRACING: CPT

## 2025-03-04 PROCEDURE — 82077 ASSAY SPEC XCP UR&BREATH IA: CPT

## 2025-03-04 PROCEDURE — 72125 CT NECK SPINE W/O DYE: CPT

## 2025-03-04 PROCEDURE — 36415 COLL VENOUS BLD VENIPUNCTURE: CPT

## 2025-03-04 PROCEDURE — 85730 THROMBOPLASTIN TIME PARTIAL: CPT

## 2025-03-04 PROCEDURE — 80053 COMPREHEN METABOLIC PANEL: CPT

## 2025-03-04 PROCEDURE — 99223 1ST HOSP IP/OBS HIGH 75: CPT

## 2025-03-04 PROCEDURE — 85610 PROTHROMBIN TIME: CPT

## 2025-03-04 PROCEDURE — 83690 ASSAY OF LIPASE: CPT

## 2025-03-04 PROCEDURE — 99284 EMERGENCY DEPT VISIT MOD MDM: CPT

## 2025-03-04 RX ORDER — LANOLIN ALCOHOL/MO/W.PET/CERES
100 CREAM (GRAM) TOPICAL DAILY
Status: DISCONTINUED | OUTPATIENT
Start: 2025-03-05 | End: 2025-03-05 | Stop reason: HOSPADM

## 2025-03-04 RX ORDER — HYDRALAZINE HYDROCHLORIDE 20 MG/ML
5 INJECTION INTRAMUSCULAR; INTRAVENOUS EVERY 6 HOURS PRN
Status: DISCONTINUED | OUTPATIENT
Start: 2025-03-04 | End: 2025-03-05 | Stop reason: HOSPADM

## 2025-03-04 RX ORDER — ONDANSETRON 2 MG/ML
4 INJECTION INTRAMUSCULAR; INTRAVENOUS EVERY 6 HOURS PRN
Status: DISCONTINUED | OUTPATIENT
Start: 2025-03-04 | End: 2025-03-05 | Stop reason: HOSPADM

## 2025-03-04 RX ORDER — ACETAMINOPHEN 10 MG/ML
1000 INJECTION, SOLUTION INTRAVENOUS ONCE
Status: COMPLETED | OUTPATIENT
Start: 2025-03-04 | End: 2025-03-04

## 2025-03-04 RX ORDER — ACETAMINOPHEN 325 MG/1
650 TABLET ORAL EVERY 6 HOURS PRN
Status: DISCONTINUED | OUTPATIENT
Start: 2025-03-04 | End: 2025-03-05

## 2025-03-04 RX ORDER — LOSARTAN POTASSIUM 50 MG/1
50 TABLET ORAL DAILY
Status: DISCONTINUED | OUTPATIENT
Start: 2025-03-05 | End: 2025-03-05

## 2025-03-04 RX ORDER — AMLODIPINE BESYLATE 5 MG/1
5 TABLET ORAL DAILY
Status: DISCONTINUED | OUTPATIENT
Start: 2025-03-05 | End: 2025-03-05

## 2025-03-04 RX ORDER — FOLIC ACID 1 MG/1
1 TABLET ORAL DAILY
Status: DISCONTINUED | OUTPATIENT
Start: 2025-03-05 | End: 2025-03-05 | Stop reason: HOSPADM

## 2025-03-04 RX ADMIN — ACETAMINOPHEN 1000 MG: 10 INJECTION INTRAVENOUS at 22:21

## 2025-03-04 NOTE — LETTER
Thank you for allowing us to participate in the care of your patient, Erich Barcenas, who was hospitalized from [unfilled] through 3/5/2025 with the admitting diagnosis of alcohol use disorder  S/p fall.  Currently patient is medically cleared for discharge and transfer to detox unit.    Medication Changes:  None    Outpatient testing recommended:  None     If you have any additional questions or would like to discuss further, please feel free to contact me.    Rylie Brown MD  [facility] Internal Medicine, Hospitalist  [phone]

## 2025-03-05 VITALS
HEART RATE: 104 BPM | WEIGHT: 218.26 LBS | DIASTOLIC BLOOD PRESSURE: 88 MMHG | OXYGEN SATURATION: 94 % | RESPIRATION RATE: 18 BRPM | BODY MASS INDEX: 31.25 KG/M2 | TEMPERATURE: 98.7 F | SYSTOLIC BLOOD PRESSURE: 150 MMHG | HEIGHT: 70 IN

## 2025-03-05 PROBLEM — E83.42 HYPOMAGNESEMIA: Status: ACTIVE | Noted: 2025-03-05

## 2025-03-05 PROBLEM — R62.51 FAILURE TO THRIVE (CHILD): Status: RESOLVED | Noted: 2025-03-04 | Resolved: 2025-03-05

## 2025-03-05 LAB
ALBUMIN SERPL BCG-MCNC: 3.7 G/DL (ref 3.5–5)
ALP SERPL-CCNC: 88 U/L (ref 34–104)
ALT SERPL W P-5'-P-CCNC: 65 U/L (ref 7–52)
ANION GAP SERPL CALCULATED.3IONS-SCNC: 14 MMOL/L (ref 4–13)
AST SERPL W P-5'-P-CCNC: 62 U/L (ref 13–39)
ATRIAL RATE: 107 BPM
BASOPHILS # BLD AUTO: 0.06 THOUSANDS/ÂΜL (ref 0–0.1)
BASOPHILS NFR BLD AUTO: 1 % (ref 0–1)
BILIRUB SERPL-MCNC: 1.53 MG/DL (ref 0.2–1)
BUN SERPL-MCNC: 10 MG/DL (ref 5–25)
CALCIUM SERPL-MCNC: 8.5 MG/DL (ref 8.4–10.2)
CHLORIDE SERPL-SCNC: 102 MMOL/L (ref 96–108)
CO2 SERPL-SCNC: 21 MMOL/L (ref 21–32)
CREAT SERPL-MCNC: 0.64 MG/DL (ref 0.6–1.3)
EOSINOPHIL # BLD AUTO: 0.07 THOUSAND/ÂΜL (ref 0–0.61)
EOSINOPHIL NFR BLD AUTO: 1 % (ref 0–6)
ERYTHROCYTE [DISTWIDTH] IN BLOOD BY AUTOMATED COUNT: 13.4 % (ref 11.6–15.1)
GFR SERPL CREATININE-BSD FRML MDRD: 103 ML/MIN/1.73SQ M
GLUCOSE SERPL-MCNC: 100 MG/DL (ref 65–140)
HCT VFR BLD AUTO: 47.2 % (ref 36.5–49.3)
HGB BLD-MCNC: 16.3 G/DL (ref 12–17)
IMM GRANULOCYTES # BLD AUTO: 0.03 THOUSAND/UL (ref 0–0.2)
IMM GRANULOCYTES NFR BLD AUTO: 0 % (ref 0–2)
LIPASE SERPL-CCNC: 25 U/L (ref 11–82)
LYMPHOCYTES # BLD AUTO: 2.15 THOUSANDS/ÂΜL (ref 0.6–4.47)
LYMPHOCYTES NFR BLD AUTO: 29 % (ref 14–44)
MAGNESIUM SERPL-MCNC: 1.5 MG/DL (ref 1.9–2.7)
MCH RBC QN AUTO: 34 PG (ref 26.8–34.3)
MCHC RBC AUTO-ENTMCNC: 34.5 G/DL (ref 31.4–37.4)
MCV RBC AUTO: 98 FL (ref 82–98)
MONOCYTES # BLD AUTO: 0.71 THOUSAND/ÂΜL (ref 0.17–1.22)
MONOCYTES NFR BLD AUTO: 10 % (ref 4–12)
NEUTROPHILS # BLD AUTO: 4.44 THOUSANDS/ÂΜL (ref 1.85–7.62)
NEUTS SEG NFR BLD AUTO: 59 % (ref 43–75)
NRBC BLD AUTO-RTO: 0 /100 WBCS
P AXIS: 53 DEGREES
PLATELET # BLD AUTO: 171 THOUSANDS/UL (ref 149–390)
PMV BLD AUTO: 10.5 FL (ref 8.9–12.7)
POTASSIUM SERPL-SCNC: 3.5 MMOL/L (ref 3.5–5.3)
PR INTERVAL: 196 MS
PROT SERPL-MCNC: 6.8 G/DL (ref 6.4–8.4)
QRS AXIS: 86 DEGREES
QRSD INTERVAL: 68 MS
QT INTERVAL: 328 MS
QTC INTERVAL: 438 MS
RBC # BLD AUTO: 4.8 MILLION/UL (ref 3.88–5.62)
SODIUM SERPL-SCNC: 137 MMOL/L (ref 135–147)
T WAVE AXIS: 74 DEGREES
VENTRICULAR RATE: 107 BPM
WBC # BLD AUTO: 7.46 THOUSAND/UL (ref 4.31–10.16)

## 2025-03-05 PROCEDURE — 99239 HOSP IP/OBS DSCHRG MGMT >30: CPT

## 2025-03-05 PROCEDURE — 99285 EMERGENCY DEPT VISIT HI MDM: CPT

## 2025-03-05 PROCEDURE — 80053 COMPREHEN METABOLIC PANEL: CPT

## 2025-03-05 PROCEDURE — 85025 COMPLETE CBC W/AUTO DIFF WBC: CPT

## 2025-03-05 PROCEDURE — 83690 ASSAY OF LIPASE: CPT

## 2025-03-05 PROCEDURE — RECHECK

## 2025-03-05 PROCEDURE — 83735 ASSAY OF MAGNESIUM: CPT

## 2025-03-05 PROCEDURE — 97161 PT EVAL LOW COMPLEX 20 MIN: CPT

## 2025-03-05 PROCEDURE — 93010 ELECTROCARDIOGRAM REPORT: CPT | Performed by: INTERNAL MEDICINE

## 2025-03-05 RX ORDER — LOSARTAN POTASSIUM 50 MG/1
50 TABLET ORAL DAILY
Status: DISCONTINUED | OUTPATIENT
Start: 2025-03-05 | End: 2025-03-05 | Stop reason: HOSPADM

## 2025-03-05 RX ORDER — MIRTAZAPINE 15 MG/1
15 TABLET, FILM COATED ORAL
Status: DISCONTINUED | OUTPATIENT
Start: 2025-03-05 | End: 2025-03-05 | Stop reason: HOSPADM

## 2025-03-05 RX ORDER — MAGNESIUM SULFATE HEPTAHYDRATE 40 MG/ML
2 INJECTION, SOLUTION INTRAVENOUS ONCE
Status: COMPLETED | OUTPATIENT
Start: 2025-03-05 | End: 2025-03-05

## 2025-03-05 RX ORDER — ACETAMINOPHEN 325 MG/1
650 TABLET ORAL EVERY 8 HOURS SCHEDULED
Status: DISCONTINUED | OUTPATIENT
Start: 2025-03-05 | End: 2025-03-05 | Stop reason: HOSPADM

## 2025-03-05 RX ORDER — FOLIC ACID 1 MG/1
1 TABLET ORAL DAILY
Start: 2025-03-06

## 2025-03-05 RX ORDER — ACETAMINOPHEN 325 MG/1
650 TABLET ORAL EVERY 6 HOURS PRN
Start: 2025-03-05

## 2025-03-05 RX ORDER — MULTIVIT WITH IRON,MINERALS
5 LIQUID (ML) ORAL DAILY
Start: 2025-03-05

## 2025-03-05 RX ORDER — ACETAMINOPHEN 325 MG/1
650 TABLET ORAL EVERY 8 HOURS SCHEDULED
Status: DISCONTINUED | OUTPATIENT
Start: 2025-03-05 | End: 2025-03-05

## 2025-03-05 RX ORDER — AMLODIPINE BESYLATE 5 MG/1
5 TABLET ORAL DAILY
Status: DISCONTINUED | OUTPATIENT
Start: 2025-03-05 | End: 2025-03-05 | Stop reason: HOSPADM

## 2025-03-05 RX ORDER — LANOLIN ALCOHOL/MO/W.PET/CERES
100 CREAM (GRAM) TOPICAL DAILY
Start: 2025-03-06

## 2025-03-05 RX ADMIN — Medication 1 TABLET: at 08:50

## 2025-03-05 RX ADMIN — MAGNESIUM SULFATE HEPTAHYDRATE 2 G: 40 INJECTION, SOLUTION INTRAVENOUS at 12:54

## 2025-03-05 RX ADMIN — SODIUM CHLORIDE 250 ML: 0.9 INJECTION, SOLUTION INTRAVENOUS at 12:53

## 2025-03-05 RX ADMIN — LOSARTAN POTASSIUM 50 MG: 50 TABLET, FILM COATED ORAL at 08:50

## 2025-03-05 RX ADMIN — FOLIC ACID 1 MG: 1 TABLET ORAL at 08:50

## 2025-03-05 RX ADMIN — ACETAMINOPHEN 650 MG: 325 TABLET ORAL at 08:50

## 2025-03-05 RX ADMIN — THIAMINE HCL TAB 100 MG 100 MG: 100 TAB at 08:50

## 2025-03-05 RX ADMIN — AMLODIPINE BESYLATE 5 MG: 5 TABLET ORAL at 08:50

## 2025-03-05 RX ADMIN — HYDRALAZINE HYDROCHLORIDE 5 MG: 20 INJECTION INTRAMUSCULAR; INTRAVENOUS at 05:58

## 2025-03-05 RX ADMIN — ACETAMINOPHEN 650 MG: 325 TABLET ORAL at 03:00

## 2025-03-05 NOTE — ED NOTES
3/5/25 @ 1200:  Asha from [facility] reports that patient was in the process of completing intake at [place] and will be going there for ETOH detox.  Fred MS

## 2025-03-05 NOTE — UTILIZATION REVIEW
Initial Clinical Review    Admission: Date/Time/Statement:   Admission Orders (From admission, onward)       Ordered        03/04/25 2112  Place in Observation  Once            03/04/25 2052  Place in Observation  Once,   Status:  Canceled                          Orders Placed This Encounter   Procedures    Place in Observation     Standing Status:   Standing     Number of Occurrences:   1     Level of Care:   Med Surg [16]     ED Arrival Information       Expected   -    Arrival   3/4/2025 17:17    Acuity   Emergent              Means of arrival   Walk-In    Escorted by   Family Member    Service   Hospitalist    Admission type   Emergency              Arrival complaint   fall, facial bruising / swelling             Chief Complaint   Patient presents with    Fall     Pt informed had a trip and fall last night. Hit his head, was supposed to be on thinner but has not had medication for 3 months. Was drinking last night, unsure if LOC.       Initial Presentation: 64 y.o. male to ED presents for Fall and hitting his face on the ground. Pt states he fell onto his face after tripping over his own feet. Landed on his face. Denies LOC. States he does drink half a bottle of whiskey daily with last drink yesterday. Pt s considering detox from alcohol. He also states he has not taking any of his medications for the greater than 2 months now due to cost. States he is unable to see out of his left eye due to swelling and minimal vision out of his right and does not feel comfortable going home. He has never detox from alcohol before. In ED, noted with elevated liver function.  CT of facial bones without contrast: Extracranial hematoma.  PMH for DVT on Eliquis, HTN, Alcohol abuse, AAA  Admit to Observation Dx; Fall with Facial hematoma, Alcohol abuse, Abnormal LFTs  Etoh level 15. Elevated  & ALT 88   Plan; Tele monitoring. Neuro checks q4h. Monitor facial swelling. CIWA assess.   Start Thiamine, folic acid and MVI.  Monitor liver function. Hold [facility]        ED Treatment-Medication Administration from 03/04/2025 1717 to 03/04/2025 2133       None            Scheduled Medications:  acetaminophen, 650 mg, Oral, Q8H BELLA  amLODIPine, 5 mg, Oral, Daily  [Held by provider] apixaban, 5 mg, Oral, BID  folic acid, 1 mg, Oral, Daily  losartan, 50 mg, Oral, Daily  multivitamin-minerals, 1 tablet, Oral, Daily  thiamine, 100 mg, Oral, Daily      Continuous IV Infusions:     PRN Meds:  hydrALAZINE, 5 mg, Intravenous, Q6H PRN  ondansetron, 4 mg, Intravenous, Q6H PRN      ED Triage Vitals   Temperature Pulse Respirations Blood Pressure SpO2 Pain Score   03/04/25 1730 03/04/25 1730 03/04/25 1730 03/04/25 1728 03/04/25 1730 03/04/25 1730   98.4 °F (36.9 °C) (!) 106 20 (!) 144/114 98 % 8     Weight (last 2 days)       Date/Time Weight    03/04/25 2300 99 (218.26)    03/04/25 1729 99.9 (220.24)            Vital Signs (last 3 days)       Date/Time Temp Pulse Resp BP MAP (mmHg) SpO2 O2 Device Patient Position - Orthostatic VS [place] Coma Scale Score CIWA-Ar Total Pain    03/05/25 0850 -- -- -- -- -- -- -- -- -- -- 7    03/05/25 0838 98.7 °F (37.1 °C) 104 18 150/88 -- 94 % None (Room air) Lying -- -- --    03/05/25 0501 98.2 °F (36.8 °C) 92 18 175/99 127 95 % None (Room air) Lying -- 4 --    03/05/25 0300 -- -- -- -- -- -- -- -- 15 -- 5    03/04/25 2300 98.2 °F (36.8 °C) 105 18 155/86 112 96 % None (Room air) Lying -- 5 3    03/04/25 2149 -- -- -- -- -- -- -- -- 15 -- 7    03/04/25 2139 98.8 °F (37.1 °C) 110 18 164/91 -- 95 % None (Room air) Lying -- 9 --    03/04/25 2123 -- -- -- -- -- 96 % None (Room air) -- -- -- --    03/04/25 2100 -- 108 18 150/99 120 98 % None (Room air) Lying -- -- --    03/04/25 2030 -- 97 12 140/74 102 98 % None (Room air) Lying -- -- --    03/04/25 2000 -- 104 18 143/82 107 98 % None (Room air) Lying -- -- --    03/04/25 1900 -- 102 16 151/77 107 98 % None (Room air) Lying -- -- --    03/04/25 1830 -- 105 14 133/78 99 98  % None (Room air) -- -- -- --    03/04/25 1815 -- 104 13 150/86 113 98 % None (Room air) -- -- -- --    03/04/25 1754 -- -- -- -- -- -- -- -- 15 -- 8    03/04/25 1751 -- -- -- -- -- -- None (Room air) -- -- -- --    03/04/25 1730 98.4 °F (36.9 °C) 106 20 -- -- 98 % None (Room air) -- -- -- 8    03/04/25 1728 -- -- -- 144/114 -- -- -- -- -- -- --           CIWA-Ar Score       Row Name 03/05/25 0501 03/04/25 2300 03/04/25 2139       CIWA-Ar    Nausea and Vomiting 0 1 1    Tactile Disturbances 0 0 1    Tremor 2 2 2    Auditory Disturbances 0 0 0    Paroxysmal Sweats 0 0 1    Visual Disturbances 0 0 0    Anxiety 1 1 1    Headache, Fullness in Head 1 1 3    Agitation 0 0 0    Orientation and Clouding of Sensorium 0 0 0    CIWA-Ar Total 4 5 9                    Pertinent Labs/Diagnostic Test Results:   Radiology:  CT head without contrast   Final Interpretation by Stan Ornelas DO (03/04 1833)      No acute intracranial abnormality.      Large extracranial hematoma superficial to the frontal bone, left greater than right extending inferiorly into the facial soft tissues.      A portion of this examination is degraded by artifact from patient's previously placed aneurysm coils.                  Workstation performed: [id number]         CT cervical spine without contrast   Final Interpretation by Stan Ornelas DO (03/04 1837)      No cervical spine fracture or traumatic malalignment.                  Workstation performed: [id number]         CT facial bones wo contrast   Final Interpretation by Stan Ornelas DO (03/04 1835)      Extracranial hematoma described in detail above with no facial fracture identified.               Workstation performed: [id number]           Cardiology:  No orders to display     GI:  No orders to display           Results from last 7 days   Lab Units 03/05/25  0507 03/04/25  1742   WBC Thousand/uL 7.46 8.14   HEMOGLOBIN g/dL 16.3 17.4*   HEMATOCRIT % 47.2 50.1*  "  PLATELETS Thousands/uL 171 206   TOTAL NEUT ABS Thousands/µL 4.44 4.75         Results from last 7 days   Lab Units 03/05/25  0507 03/04/25 2236 03/04/25  1742   SODIUM mmol/L 137 138 136   POTASSIUM mmol/L 3.5 3.5 5.5*   CHLORIDE mmol/L 102 102 102   CO2 mmol/L 21 21 20*   ANION GAP mmol/L 14* 15* 14*   BUN mg/dL 10 8 7   CREATININE mg/dL 0.64 0.64 0.56*   EGFR ml/min/1.73sq m 103 103 109   CALCIUM mg/dL 8.5 8.6 8.7   MAGNESIUM mg/dL 1.5*  --   --    PHOSPHORUS mg/dL  --  1.7*  --      Results from last 7 days   Lab Units 03/05/25  0507 03/04/25 2236 03/04/25  1742   AST U/L 62* 73* 110*   ALT U/L 65* 75* 88*   ALK PHOS U/L 88 93 96   TOTAL PROTEIN g/dL 6.8 6.9 7.8   ALBUMIN g/dL 3.7 3.8 4.2   TOTAL BILIRUBIN mg/dL 1.53* 0.91 0.93         Results from last 7 days   Lab Units 03/05/25  0507 03/04/25 2236 03/04/25  1742   GLUCOSE RANDOM mg/dL 100 127 121             No results found for: \"BETA-HYDROXYBUTYRATE\"                           Results from last 7 days   Lab Units 03/04/25  1742   PROTIME seconds 13.0   INR  0.93   PTT seconds 24                                             Results from last 7 days   Lab Units 03/05/25  0507 03/04/25  2236   LIPASE u/L 25 26                                 Results from last 7 days   Lab Units 03/04/25  2236   ETHANOL LVL mg/dL 15*                                   Past Medical History:   Diagnosis Date    AAA (abdominal aortic aneurysm) (HCC)     Femoral DVT (deep venous thrombosis) (HCC)     Hypertension     Stroke (HCC)      Present on Admission:   Acute deep vein thrombosis (DVT) of femoral vein of right lower extremity (HCC)   Essential hypertension   Peripheral artery disease (HCC)   Infrarenal abdominal aortic aneurysm (AAA) without rupture (HCC)   Chronic bronchitis with emphysema (HCC)   Alcohol abuse   Abnormal LFTs   Facial hematoma   Fall      Admitting Diagnosis: Periorbital hematoma [H05.239]  Head injury [S09.90XA]  Failure to thrive in adult [R62.7]  Pain " [R52]  Age/Sex: 64 y.o. male    Network Utilization Review Department  ATTENTION: Please call with any questions or concerns to [phone] and carefully listen to the prompts so that you are directed to the right person. All voicemails are confidential.   For Discharge needs, contact Care Management DC Support Team at [phone] opt. 2  Send all requests for admission clinical reviews, approved or denied determinations and any other requests to dedicated fax number below belonging to the [facility] where the patient is receiving treatment. List of dedicated fax numbers for the Facilities:  FACILITY NAME UR FAX NUMBER   ADMISSION DENIALS (Administrative/Medical Necessity) [phone]   DISCHARGE SUPPORT TEAM (NETWORK) [phone]   PARENT CHILD HEALTH (Maternity/NICU/Pediatrics) [phone]   [facility] [phone]   [facility] [phone]   Critical access hospital [phone]   [facility] [phone]   [facility] [phone]   [facility] [phone]   [facility] [phone]   [facility] [phone]   [facility] [phone]   [facility] [phone]   [facility] [phone]   [facility] [phone]

## 2025-03-05 NOTE — ED NOTES
3/5/25 @ 1115:  Asha from [facility] arrived and will meet with patient to discuss substance abuse treatment options.  [facility] provided Asha with facesheet.  Fred MS

## 2025-03-05 NOTE — PLAN OF CARE
Problem: PAIN - ADULT  Goal: Verbalizes/displays adequate comfort level or baseline comfort level  Description: Interventions:  - Encourage patient to monitor pain and request assistance  - Assess pain using appropriate pain scale  - Administer analgesics based on type and severity of pain and evaluate response  - Implement non-pharmacological measures as appropriate and evaluate response  - Consider cultural and social influences on pain and pain management  - Notify physician/advanced practitioner if interventions unsuccessful or patient reports new pain  Outcome: Progressing     Problem: INFECTION - ADULT  Goal: Absence or prevention of progression during hospitalization  Description: INTERVENTIONS:  - Assess and monitor for signs and symptoms of infection  - Monitor lab/diagnostic results  - Monitor all insertion sites, i.e. indwelling lines, tubes, and drains  - Monitor endotracheal if appropriate and nasal secretions for changes in amount and color  - Burney appropriate cooling/warming therapies per order  - Administer medications as ordered  - Instruct and encourage patient and family to use good hand hygiene technique  - Identify and instruct in appropriate isolation precautions for identified infection/condition  Outcome: Progressing  Goal: Absence of fever/infection during neutropenic period  Description: INTERVENTIONS:  - Monitor WBC    Outcome: Progressing     Problem: SAFETY ADULT  Goal: Patient will remain free of falls  Description: INTERVENTIONS:  - Educate patient/family on patient safety including physical limitations  - Instruct patient to call for assistance with activity   - Consult OT/PT to assist with strengthening/mobility   - Keep Call bell within reach  - Keep bed low and locked with side rails adjusted as appropriate  - Keep care items and personal belongings within reach  - Initiate and maintain comfort rounds  - Make Fall Risk Sign visible to staff  - Offer Toileting every 2 Hours,  in advance of need  - Initiate/Maintain bed/chair alarm  - Obtain necessary fall risk management equipment: none  - Apply yellow socks and bracelet for high fall risk patients  - Consider moving patient to room near nurses station  Outcome: Progressing  Goal: Maintain or return to baseline ADL function  Description: INTERVENTIONS:  -  Assess patient's ability to carry out ADLs; assess patient's baseline for ADL function and identify physical deficits which impact ability to perform ADLs (bathing, care of mouth/teeth, toileting, grooming, dressing, etc.)  - Assess/evaluate cause of self-care deficits   - Assess range of motion  - Assess patient's mobility; develop plan if impaired  - Assess patient's need for assistive devices and provide as appropriate  - Encourage maximum independence but intervene and supervise when necessary  - Involve family in performance of ADLs  - Assess for home care needs following discharge   - Consider OT consult to assist with ADL evaluation and planning for discharge  - Provide patient education as appropriate  Outcome: Progressing  Goal: Maintains/Returns to pre admission functional level  Description: INTERVENTIONS:  - Perform AM-PAC 6 Click Basic Mobility/ Daily Activity assessment daily.  - Set and communicate daily mobility goal to care team and patient/family/caregiver.   - Collaborate with rehabilitation services on mobility goals if consulted  - Perform Range of Motion 3 times a day.  - Reposition patient every 2 hours.  - Dangle patient 3 times a day  - Stand patient 3 times a day  - Ambulate patient 3 times a day  - Out of bed to chair 3 times a day   - Out of bed for meals 3 times a day  - Out of bed for toileting  - Record patient progress and toleration of activity level   Outcome: Progressing     Problem: DISCHARGE PLANNING  Goal: Discharge to home or other facility with appropriate resources  Description: INTERVENTIONS:  - Identify barriers to discharge w/patient and  caregiver  - Arrange for needed discharge resources and transportation as appropriate  - Identify discharge learning needs (meds, wound care, etc.)  - Arrange for interpretive services to assist at discharge as needed  - Refer to Case Management Department for coordinating discharge planning if the patient needs post-hospital services based on physician/advanced practitioner order or complex needs related to functional status, cognitive ability, or social support system  Outcome: Progressing     Problem: Knowledge Deficit  Goal: Patient/family/caregiver demonstrates understanding of disease process, treatment plan, medications, and discharge instructions  Description: Complete learning assessment and assess knowledge base.  Interventions:  - Provide teaching at level of understanding  - Provide teaching via preferred learning methods  Outcome: Progressing     Problem: Prexisting or High Potential for Compromised Skin Integrity  Goal: Skin integrity is maintained or improved  Description: INTERVENTIONS:  - Identify patients at risk for skin breakdown  - Assess and monitor skin integrity  - Assess and monitor nutrition and hydration status  - Monitor labs   - Assess for incontinence   - Turn and reposition patient  - Assist with mobility/ambulation  - Relieve pressure over bony prominences  - Avoid friction and shearing  - Provide appropriate hygiene as needed including keeping skin clean and dry  - Evaluate need for skin moisturizer/barrier cream  - Collaborate with interdisciplinary team   - Patient/family teaching  - Consider wound care consult   Outcome: Progressing

## 2025-03-05 NOTE — PROGRESS NOTES
Progress Note - Hospitalist   Name: Erich Barcenas 64 y.o. male I MRN: [id number]  Unit/Bed#: [address] [place] [address] Date of Admission: 3/4/2025   Date of Service: 3/5/2025 I Hospital Day: 0    Assessment & Plan  Fall  POA s/p fall and hitting face on the ground.  Patient states he was walking around his house mechanically tripped and fell onto his face.  Denies loss of consciousness.  His daughter came today and saw his face and how badly he was bruised and told him he had to come to the ED  He admits he drinks daily half a bottle of whiskey.  Last drink was yesterday afternoon  CT of the head: No acute intracranial abnormality.Large extracranial hematoma superficial to the frontal bone, left greater than right extending inferiorly into the facial soft tissues. A portion of this examination is degraded by artifact from patient's previously placed aneurysm coils.  CT of the spine. No cervical spine fracture or traumatic malalignment.  Suspect fall 2/2/ alcohol use and mechanical  Plan  PT /OT consulted; pending evaluation   Alcohol cessation-patient is considering going to detox and accept help  Fall precautions  Neurochecks every 4  Facial hematoma  POA s/p fall and hitting face on the ground.  Patient states he was walking around his house mechanically tripped and fell onto his face.  Denies loss of consciousness.  His daughter came today and saw his face and told him he had to come to the ED  He admits he drinks daily half a bottle of whiskey.  Last drink was yesterday afternoon.   Noted in the ED, periorbital ecchymosis: large facial hematoma on left side of face, unable to open left eye & and minimal vision out of right eye due to swelling & ecchymosis  (See media)  CT of facial bones without contrast: Extracranial hematoma described in detail above with no facial fracture identified.   Plan  Monitor facial swelling  Home med eliquis on hold insetting large hematoma  See above plan  Alcohol abuse  admits he drinks  daily half a bottle of whiskey.  Last drink was yesterday afternoon.  Has never withdrawn from alcohol and no history of seizures  Ethanol level 15  CIWA protocol  Multivitamin thiamine folic acid initiated  Encouraged alcohol cessation-patient is interested in detox, case management consulted  Monitor on telemetry  Abnormal LFTs  POA elevated  & ALT 88  ALk phos 96  Lipase 26  Etiology 2/2 etoh abuse/chronic usage  Denies any abd pain  Monitor Liver function   Encouraged alcohol cessation  Acute deep vein thrombosis (DVT) of femoral vein of right lower extremity (HCC)  Patient states he has not been taking his Eliquis the last 2 months due to cost  Eliquis 5 mg ID on hold in the setting of large facial hematoma  Case management consulted appreciate recommendations help obtaining medications  Denies any shortness of breath, lower leg swelling, or pain  Encourage medication compliance  Essential hypertension  Blood pressure admission 150/86  Per patient has not taken his blood pressure medications constantly due to cost  Home medications amlodipine and losartan continued  Encourage compliance with medication  Chronic bronchitis with emphysema (HCC)  Not in acute exacerbation  Pulmonary hygiene  Peripheral artery disease (HCC)  Medical histroy   Infrarenal abdominal aortic aneurysm (AAA) without rupture (HCC)  Noted in history   History of cerebral aneurysm    Hypomagnesemia  Replete per protocol     VTE Pharmacologic Prophylaxis: VTE Score: 8 Moderate Risk (Score 3-4) - Pharmacological DVT Prophylaxis Contraindicated. Sequential Compression Devices Ordered.    Mobility:   Basic Mobility Inpatient Raw Score: 18  JH-HLM Goal: 6: Walk 10 steps or more  JH-HLM Achieved: 6: Walk 10 steps or more  JH-HLM Goal achieved. Continue to encourage appropriate mobility.    Patient Centered Rounds: I performed bedside rounds with nursing staff today.   Discussions with Specialists or Other Care Team Provider: Case  management, PT OT    Education and Discussions with Family / Patient: Updated  (daughter) at bedside.    Current Length of Stay: 0 day(s)  Current Patient Status: Observation   Certification Statement: The patient will continue to require additional inpatient hospital stay due to alcohol withdrawal, fall, hematoma  Discharge Plan: Anticipate discharge in 24-48 hrs to rehab facility.    Code Status: Level 1 - Full Code    Subjective   Patient seen today at bedside.  He is very cooperative and pleasant.  Alert and oriented at baseline.  Does not have any active complaints.  Reports improvement in vision since the swelling and numbness is coming down.  Denies any chest pain or tightness, nausea or vomiting, diarrhea or constipation or any active urinary symptoms        Objective :  Temp:  [98.2 °F (36.8 °C)-98.8 °F (37.1 °C)] 98.7 °F (37.1 °C)  HR:  [] 104  BP: (133-175)/() 150/88  Resp:  [12-20] 18  SpO2:  [94 %-98 %] 94 %  O2 Device: None (Room air)    Body mass index is 31.32 kg/m².     Input and Output Summary (last 24 hours):     Intake/Output Summary (Last 24 hours) at 3/5/2025 1121  Last data filed at 3/5/2025 0501  Gross per 24 hour   Intake --   Output 200 ml   Net -200 ml       Physical Exam  Vitals and nursing note reviewed.   Constitutional:       General: He is not in acute distress.     Appearance: Normal appearance.   HENT:      Head: Normocephalic. Raccoon eyes present.        Comments: Swelling around the eyes noted bilaterally.  More on the left side.  Patient is able to open his eyes.  Patient decreased secondary to swelling surrounding the eyes however improved compared to admission time.  Patient reports a fall recently with head trauma.  Refer to media tab     Mouth/Throat:      Mouth: Mucous membranes are moist.   Eyes:      Conjunctiva/sclera: Conjunctivae normal.      Pupils: Pupils are equal, round, and reactive to light.   Cardiovascular:      Rate and Rhythm: Normal  rate and regular rhythm.      Pulses: Normal pulses.           Carotid pulses are 2+ on the right side and 2+ on the left side.       Radial pulses are 2+ on the right side and 2+ on the left side.        Dorsalis pedis pulses are 2+ on the right side and 2+ on the left side.      Heart sounds: Normal heart sounds, S1 normal and S2 normal. No murmur heard.  Pulmonary:      Effort: No tachypnea, bradypnea or accessory muscle usage.      Breath sounds: Normal breath sounds and air entry. No decreased breath sounds, wheezing, rhonchi or rales.   Abdominal:      General: Abdomen is flat. Bowel sounds are normal. There is no distension.      Palpations: Abdomen is soft.      Tenderness: There is no abdominal tenderness.   Musculoskeletal:      Right lower leg: No edema.      Left lower leg: No edema.   Neurological:      Mental Status: He is alert and oriented to person, place, and time. Mental status is at baseline.   Psychiatric:         Mood and Affect: Mood normal.         Behavior: Behavior normal.           Lines/Drains:        Telemetry:  Telemetry Orders (From admission, onward)               24 Hour Telemetry Monitoring  Continuous x 24 Hours (Telem)        Expiring   Question:  Reason for 24 Hour Telemetry  Answer:  Syncope suspected to be cardiac in origin                     Telemetry Reviewed: Normal Sinus Rhythm  Indication for Continued Telemetry Use: Metabolic/electrolyte disturbance with high probability of dysrhythmia               Lab Results: I have reviewed the following results:   Results from last 7 days   Lab Units 03/05/25  0507   WBC Thousand/uL 7.46   HEMOGLOBIN g/dL 16.3   HEMATOCRIT % 47.2   PLATELETS Thousands/uL 171   SEGS PCT % 59   LYMPHO PCT % 29   MONO PCT % 10   EOS PCT % 1     Results from last 7 days   Lab Units 03/05/25  0507   SODIUM mmol/L 137   POTASSIUM mmol/L 3.5   CHLORIDE mmol/L 102   CO2 mmol/L 21   BUN mg/dL 10   CREATININE mg/dL 0.64   ANION GAP mmol/L 14*   CALCIUM mg/dL  8.5   ALBUMIN g/dL 3.7   TOTAL BILIRUBIN mg/dL 1.53*   ALK PHOS U/L 88   ALT U/L 65*   AST U/L 62*   GLUCOSE RANDOM mg/dL 100     Results from last 7 days   Lab Units 03/04/25  1742   INR  0.93                   Recent Cultures (last 7 days):         Imaging Results Review: No pertinent imaging studies reviewed.  Other Study Results Review: EKG was reviewed.     Last 24 Hours Medication List:     Current Facility-Administered Medications:     acetaminophen (TYLENOL) tablet 650 mg, Q8H BELLA    amLODIPine (NORVASC) tablet 5 mg, Daily    [Held by provider] apixaban (ELIQUIS) tablet 5 mg, BID    folic acid (FOLVITE) tablet 1 mg, Daily    hydrALAZINE (APRESOLINE) injection 5 mg, Q6H PRN    losartan (COZAAR) tablet 50 mg, Daily    magnesium sulfate 2 g/50 mL IVPB (premix) 2 g, Once    multivitamin-minerals (CENTRUM) tablet 1 tablet, Daily    ondansetron (ZOFRAN) injection 4 mg, Q6H PRN    sodium chloride 0.9 % bolus 250 mL, Once    thiamine tablet 100 mg, Daily    Administrative Statements   Today, Patient Was Seen By: Rylie Brown MD  I have spent a total time of 30 minutes in caring for this patient on the day of the visit/encounter including Diagnostic results, Prognosis, Risks and benefits of tx options, and Instructions for management.    **Please Note: This note may have been constructed using a voice recognition system.**

## 2025-03-05 NOTE — ASSESSMENT & PLAN NOTE
admits he drinks daily half a bottle of whiskey.  Last drink was yesterday afternoon.  Has never withdrawn from alcohol and no history of seizures  Ethanol level 15  [facility] protocol  Multivitamin thiamine folic acid initiated  Encouraged alcohol cessation-patient is interested in detox, case management consulted  Monitor on telemetry

## 2025-03-05 NOTE — CASE MANAGEMENT
Case Management Discharge Planning Note    Patient name Erich Barcenas  Location [address] [address]/[address] [address] MRN [id number]  : 1960 Date 3/5/2025       Current Admission Date: 3/4/2025  Current Admission Diagnosis:Fall   Patient Active Problem List    Diagnosis Date Noted Date Diagnosed    Hypomagnesemia 2025     Facial hematoma 2025     Fall 2025     Alcohol abuse 11/15/2024     Abnormal LFTs 11/15/2024     History of DVT (deep vein thrombosis) 10/15/2024     Subclinical hypothyroidism 2023     Peripheral artery disease (HCC) 2023     Infrarenal abdominal aortic aneurysm (AAA) without rupture (HCC) 2023     Chronic bronchitis with emphysema (HCC) 2023     History of cerebral aneurysm 2023     Acute deep vein thrombosis (DVT) of femoral vein of right lower extremity (HCC) 11/10/2023     Essential hypertension 2017       LOS (days): 0  Geometric Mean LOS (GMLOS) (days):   Days to GMLOS:     OBJECTIVE:            Current admission status: Observation   Preferred Pharmacy:   [facility] [place], [place] - [address]  [address]  [facility] [address]  Phone: [phone] Fax: [phone]    Primary Care Provider: James Queen DO    Primary Insurance: HEALTHCARE ASSISTANCE PENDING  Secondary Insurance:     DISCHARGE DETAILS:    Discharge planning discussed with:: Patient, and both daughters  Freedom of Choice: Yes  Comments - Freedom of Choice: IP alcohol rehab  CM contacted family/caregiver?: Yes  Were Treatment Team discharge recommendations reviewed with patient/caregiver?: Yes  Did patient/caregiver verbalize understanding of patient care needs?: Yes  Were patient/caregiver advised of the risks associated with not following Treatment Team discharge recommendations?: Yes    Contacts  Patient Contacts: Lana Logan (daughters)  Relationship to Patient:: Family  Contact Method: In Person, Phone  Phone  Number: [phone]  Reason/Outcome: Discharge Planning, Emergency Contact, Continuity of Care    Requested Home Health Care         Is the patient interested in C at discharge?: No    DME Referral Provided  Referral made for DME?: No    Other Referral/Resources/Interventions Provided:  Interventions: Other (Specify) (IP alcohol rehab)  Referral Comments: RN MANDY made aware by Dinora that patient will be going to [facility] re[facility] and daughter Lana will be transporting him at 1500 as patient needs to be at the facility by 1600. Attending physician and KATELYNN Medina made aware.     Treatment Team Recommendation:  (TBD)  Discharge Destination Plan:: Other, Substance Abuse Treatment (IP alcohol rehab at [facility])  Transport at Discharge : Family

## 2025-03-05 NOTE — PHYSICAL THERAPY NOTE
PT EVALUATION     03/05/25 1400   PT Last Visit   PT Visit Date 03/05/25   Note Type   Note type Evaluation   Pain Assessment   Pain Assessment Tool Dye-Baker FACES   Dye-Baker FACES Pain Rating 6  (facial area, with ecchymosis from fall)   Restrictions/Precautions   Other Precautions Fall Risk;Pain   Home Living   Type of Home House   Home Layout Two level;Stairs to enter with rails   Home Equipment Cane;Walker   Additional Comments patient independent without an assistive device prior to admission   Prior Function   Level of [place] Independent with ADLs;Independent with functional mobility;Independent with IADLS   Lives With Alone   Receives Help From Family   General   Additional Pertinent History chart reviewed, patient admitted with fall and ETOH. Patient now ambulating with supervision in room without balance loss. No skilled PT needs at this time.   Cognition   Overall Cognitive Status WFL   Arousal/Participation Cooperative   Orientation Level Oriented X4   Following Commands Follows all commands and directions without difficulty   Subjective   Subjective patient states moving in his hospital room with nursing supervision   RUE Assessment   RUE Assessment WFL   LUE Assessment   LUE Assessment WFL   RLE Assessment   RLE Assessment WFL   LLE Assessment   LLE Assessment WFL   Coordination   Movements are Fluid and Coordinated 0   Coordination and Movement Description decreased coordination with higher level balance activity, recovering with ETOH and related fall   Bed Mobility   Supine to Sit 7  Independent   Sit to Supine 7  Independent   Transfers   Sit to Stand 7  Independent   Stand to Sit 7  Independent   Ambulation/Elevation   Gait Assistance   (supervision to independent)   Assistive Device None   Distance 45 feet with numerous changes in direction, slow gait speed, no balance loss noted   Balance   Static Sitting Good   Dynamic Sitting Fair +   Static Standing Fair +   Dynamic Standing Fair    Ambulatory Fair   Activity Tolerance   Activity Tolerance Patient tolerated treatment well;Patient limited by fatigue   Nurse Made Aware yes   Assessment   Assessment Patient seen for Physical Therapy evaluation. Patient admitted with Fall.  Comorbidities affecting patient's physical performance include: . Patient now presents as ambulating independently in his hospital room, no skilled PT needs at this time. Personal factors affecting patient at time of initial evaluation include: lives in two story house, inability to ambulate household distances, inability to navigate community distances, inability to navigate level surfaces without external assistance, inability to perform dynamic tasks in community, limited home support, and positive fall history. Prior to admission, patient was independent with functional mobility without assistive device, independent with ADLS, independent with IADLS, ambulating household distance, and ambulating community distances.  Please find objective findings from Physical Therapy assessment regarding body systems outlined above with impairments and limitations including no new functional deficits.  The Barthel Index was used as a functional outcome tool presenting with a score of Barthel Index Score: 95 today indicating no limitations of functional mobility and ADLS.  Patient's clinical presentation is currently stable  as seen in patient's presentation of changing level of pain. Pt would benefit from continued Physical Therapy treatment to address deficits as defined above and maximize level of functional mobility. As demonstrated by objective findings, the assigned level of complexity for this evaluation is low.The patient's AM-[facility] Basic Mobility Inpatient Short Form Raw Score is 22. A Raw score of greater than 16 suggests the patient may benefit from discharge to home. Please also refer to the recommendation of the Physical Therapist for safe discharge planning.   Goals   Patient  Goals to get to alcohol rehab   STG Expiration Date   (na)   LTG Expiration Date   (na)   Discharge Recommendation   Rehab Resource Intensity Level, PT No post-acute rehabilitation needs   AM-PAC Basic Mobility Inpatient   Turning in Flat Bed Without Bedrails 4   Lying on Back to Sitting on Edge of Flat Bed Without Bedrails 4   Moving Bed to Chair 4   Standing Up From Chair Using Arms 4   Walk in Room 3   Climb 3-5 Stairs With Railing 3   Basic Mobility Inpatient Raw Score 22   Basic Mobility Standardized Score 47.4   [facility] Highest Level Of Mobility   -HLM Goal 7: Walk 25 feet or more   JH-HLM Achieved 7: Walk 25 feet or more   Barthel Index   Feeding 10   Bathing 5   Grooming Score 5   Dressing Score 10   Bladder Score 10   Bowels Score 10   Toilet Use Score 10   Transfers (Bed/Chair) Score 15   Mobility (Level Surface) Score 15   Stairs Score 5   Barthel Index Score 95   Licensure   [place] License Number  Monie Berumen PT [id number]

## 2025-03-05 NOTE — ASSESSMENT & PLAN NOTE
Patient states he has not been taking his Eliquis the last 2 months due to cost  Eliquis 5 mg ID on hold in the setting of large facial hematoma  Case management consulted appreciate recommendations help obtaining medications  Denies any shortness of breath, lower leg swelling, or pain  Encourage medication compliance

## 2025-03-05 NOTE — ASSESSMENT & PLAN NOTE
admits he drinks daily half a bottle of whiskey.  Last drink was yesterday afternoon.  Has never withdrawn from alcohol and no history of seizures  Ethanol level 15  [facility] protocol  Multivitamin thiamine folic acid initiated  Encouraged alcohol cessation-patient is interested in detox, case management consulted  Monitor on telemetry

## 2025-03-05 NOTE — DISCHARGE INSTR - AVS FIRST PAGE
Please make sure to apply ice to the swollen area on your forehead.  Please make sure to take Tylenol for pain as needed.

## 2025-03-05 NOTE — PLAN OF CARE
Problem: PAIN - ADULT  Goal: Verbalizes/displays adequate comfort level or baseline comfort level  Description: Interventions:  - Encourage patient to monitor pain and request assistance  - Assess pain using appropriate pain scale  - Administer analgesics based on type and severity of pain and evaluate response  - Implement non-pharmacological measures as appropriate and evaluate response  - Consider cultural and social influences on pain and pain management  - Notify physician/advanced practitioner if interventions unsuccessful or patient reports new pain  Outcome: Progressing     Problem: INFECTION - ADULT  Goal: Absence or prevention of progression during hospitalization  Description: INTERVENTIONS:  - Assess and monitor for signs and symptoms of infection  - Monitor lab/diagnostic results  - Monitor all insertion sites, i.e. indwelling lines, tubes, and drains  - Monitor endotracheal if appropriate and nasal secretions for changes in amount and color  - [place] appropriate cooling/warming therapies per order  - Administer medications as ordered  - Instruct and encourage patient and family to use good hand hygiene technique  - Identify and instruct in appropriate isolation precautions for identified infection/condition  Outcome: Progressing  Goal: Absence of fever/infection during neutropenic period  Description: INTERVENTIONS:  - Monitor WBC    Outcome: Progressing     Problem: SAFETY ADULT  Goal: Patient will remain free of falls  Description: INTERVENTIONS:  - Educate patient/family on patient safety including physical limitations  - Instruct patient to call for assistance with activity   - Consult OT/PT to assist with strengthening/mobility   - Keep Call bell within reach  - Keep bed low and locked with side rails adjusted as appropriate  - Keep care items and personal belongings within reach  - Initiate and maintain comfort rounds  - Make Fall Risk Sign visible to staff  - Offer Toileting every 2 Hours,  in advance of need  - Initiate/Maintain BED alarm  - Obtain necessary fall risk management equipment:   - Apply yellow socks and bracelet for high fall risk patients  - Consider moving patient to room near nurses station  Outcome: Progressing  Goal: Maintain or return to baseline ADL function  Description: INTERVENTIONS:  -  Assess patient's ability to carry out ADLs; assess patient's baseline for ADL function and identify physical deficits which impact ability to perform ADLs (bathing, care of mouth/teeth, toileting, grooming, dressing, etc.)  - Assess/evaluate cause of self-care deficits   - Assess range of motion  - Assess patient's mobility; develop plan if impaired  - Assess patient's need for assistive devices and provide as appropriate  - Encourage maximum independence but intervene and supervise when necessary  - Involve family in performance of ADLs  - Assess for home care needs following discharge   - Consider OT consult to assist with ADL evaluation and planning for discharge  - Provide patient education as appropriate  Outcome: Progressing  Goal: Maintains/Returns to pre admission functional level  Description: INTERVENTIONS:  - Perform AM-PAC 6 Click Basic Mobility/ Daily Activity assessment daily.  - Set and communicate daily mobility goal to care team and patient/family/caregiver.   - Collaborate with rehabilitation services on mobility goals if consulted  - Perform Range of Motion 2 times a day.  - Reposition patient every 2 hours.  - Dangle patient 2 times a day  - Stand patient 2 times a day  - Ambulate patient 2 times a day  - Out of bed to chair 2 times a day   - Out of bed for meals 2 times a day  - Out of bed for toileting  - Record patient progress and toleration of activity level   Outcome: Progressing     Problem: DISCHARGE PLANNING  Goal: Discharge to home or other facility with appropriate resources  Description: INTERVENTIONS:  - Identify barriers to discharge w/patient and caregiver  -  Arrange for needed discharge resources and transportation as appropriate  - Identify discharge learning needs (meds, wound care, etc.)  - Arrange for interpretive services to assist at discharge as needed  - Refer to Case Management Department for coordinating discharge planning if the patient needs post-hospital services based on physician/advanced practitioner order or complex needs related to functional status, cognitive ability, or social support system  Outcome: Progressing     Problem: Knowledge Deficit  Goal: Patient/family/caregiver demonstrates understanding of disease process, treatment plan, medications, and discharge instructions  Description: Complete learning assessment and assess knowledge base.  Interventions:  - Provide teaching at level of understanding  - Provide teaching via preferred learning methods  Outcome: Progressing

## 2025-03-05 NOTE — ASSESSMENT & PLAN NOTE
admits he drinks daily half a bottle of whiskey.  Last drink was yesterday afternoon.  Has never withdrawn from alcohol and no history of seizures  Ethanol level 15  [facility] protocol  Multivitamin thiamine folic acid initiated  Encouraged alcohol cessation-patient is interested in detox, case management consulted  Patient is accepted at detox unit.  Will be transported by family member to detox unit today.

## 2025-03-05 NOTE — ASSESSMENT & PLAN NOTE
POA elevated  & ALT 88  ALk phos 96  Lipase 26  Etiology 2/2 etoh abuse/chronic usage  Denies any abd pain  Encouraged alcohol cessation  Patient follow-up with PCP

## 2025-03-05 NOTE — ASSESSMENT & PLAN NOTE
POA s/p fall and hitting face on the ground.  Patient states he was walking around his house mechanically tripped and fell onto his face.  Denies loss of consciousness.  His daughter came today and saw his face and how badly he was bruised and told him he had to come to the ED  He admits he drinks daily half a bottle of whiskey.  Last drink was yesterday afternoon  CT of the head: No acute intracranial abnormality.Large extracranial hematoma superficial to the frontal bone, left greater than right extending inferiorly into the facial soft tissues. A portion of this examination is degraded by artifact from patient's previously placed aneurysm coils.  CT of the spine. No cervical spine fracture or traumatic malalignment.  Suspect fall 2/2/ alcohol use and mechanical  Plan  PT /OT consulted; pending evaluation   Alcohol cessation-patient is considering going to detox and accept help  Fall precautions  Neurochecks every 4

## 2025-03-05 NOTE — ASSESSMENT & PLAN NOTE
Blood pressure admission 150/86  Per patient has not taken his blood pressure medications constantly due to cost  Home medications amlodipine and losartan continued  Encourage compliance with medication

## 2025-03-05 NOTE — CONSULTS
Consultation - Erich Barcenas 64 y.o. male MRN: [id number]    Unit/Bed#: [address] [place] [address] Encounter: [id number]      Identifying Data: 64 years old white male is admitted at [facility] on March 4, 2025 after I fell'''' patient has facial hematoma around his eyes his left eye is swollen more after the fall .  Psychiatric consultation is asked to evaluate for mental competency because patient is an alcoholic he lives alone he is not taking care of himself he has been noncompliance with his medical and psychiatric needed care family is concerned.  Patient examined spoke to the nurse discussed with the daughter with patient's permission in person at bedside spoke to the  medical progress notes and psychiatric progress note from the  noted patient is very cooperative alert awake oriented x 3 he was able to tell me that he fell but he was not drunk then he called his daughter who came over and brought him to the hospital patient also was able to recognize his problem he reports that he has been drinking throughout his life but now its time to stop like he stopped smoking 2 years ago he can do it and he is willing to go for inpatient alcohol rehab and he agreed that he will not drink alcohol anymore after the discharge back home.  Daughter reports that he is very stubborn and he does not listen to them patient has 2 daughters and he refused to give them power of .  Patient also is not seen a doctor he supposed to be taking Eliquis and blood pressure medications from the [facility] family physician doctors in [place] but he stopped taking the medications because'' I cannot afford it'' he was able to tell me what happened to him able to tell me his age date of birth currently her current month name of the president and he knows that he is at the hospital after he fell he admits to being an alcoholic he was able to give out his background basic medical history including he had right  inguinal hernia surgery and brain procedure for aneurysm 14 years ago he is fully aware that he needs blood pressure medications and he supposed to be taking blood thinner medication Eliquis but he could not afford it and he stopped taking it when I pointed out the risk of not taking the medicine he was able to understand clearly that he can have a medical complications and it can make him seriously medically sick and he may die.  Patient also reports that he lost his wife 15 years ago from stage IV colon cancer at age 50 and he missed her' terribly''' he has no girlfriend and he is fully aware that he does have a drinking problem and now he is ready to get help.  Patient does have a history of noncompliance daughter described his home feels he and all empty bottles around in his house he does not clean the house he does not take care of himself as described above and she is concerned about him.  After talking to the different people including his daughter  nurse reviewing the record I came to the conclusion that patient is mentally capable to understand his problems limitations and needs and consequences of recommended medical and mental substance abuse needed care including death to the best of my knowledge this is my clinical judgment and professional opinion that patient is mentally competent at this time and he agreed to comply with the recommended treatment at this time.    Social history patient lost his wife 15 years ago as described above he lives alone he has 2 grownup daughters they are supportive to him but he does not listen to them patient stopped smoking 2 years ago and he has been drinking alcohol throughout his life but he has no DUI no rehabs and he does not go to AA meeting or any substance abuse counseling.  Patient had 11th grade education and he was a  he stopped working 2 years ago.  Patient denies drug abuse history.  He denies having a girlfriend at this  time.    Allergy no known drug allergy.    Diagnosis  Major depression recurrent  Alcohol abuse dependent  Anxiety  Insomnia  Rule out pathological bereavement he lost his wife 15 years ago and he did not go for help.  Essential hypertension chronic bronchitis with emphysema abdominal aortic aneurysm DVT stroke  Appendectomy cerebral aneurysm repair hernia repair.    Chief Complaints: Evaluate for mental competency.    Family History: Sister is an alcoholic.  Family History   Problem Relation Age of Onset    Heart disease Mother     Heart disease Father        Legal History: Patient denies    Mental Status Exam: 64 years old white male is sitting in the bed eating his lunch cooperative alert awake oriented x 3 memory intact he looks of his stated age his face is swollen as described above after the fall and he developed the facial hematoma.  Poor sleep appetite okay.  No shakes no alcohol withdrawal noted admits missing his wife a lot he becomes emotional while he talks about his wife's death patient is not confused no shakes no alcohol withdrawal noted he was able to tell me that when he fell he was not drunk but he admits having a drinking problem and he drinks daily half a bottle of whiskey.  Patient denies auditory or visual hallucinations.  Patient denies suicidal or homicidal ideation.  Poor concentration.  Patient is very cooperative and not confused not delirious.  No paranoia no delusion elucidated poor concentration.  Insight and judgment are adequate.      History of Present Illness     HPI: Erich Barcenas is a 64 y.o. year old male who presents with fall and facial hematoma    Inpatient consult to Psychiatry  Consult performed by: Jonn Gerard MD  Consult ordered by: Rylie Brown MD            Historical Information   Past Psychiatric History: Patient describes lifelong alcohol abuse since he was very young and he drank throughout his life and he is still drinking as described above but he  never went for help he denies DUI denies rehab he does not go to AA meetings he has been noncompliance of his needed treatments he admits missing his wife and feels depressed but he never went for help he never seen a psychiatrist he never went for counseling no psychiatric admissions no suicide attempts in the past he denies drug abuse history he denies legal problems in the past he does not remember taking any depression anxiety medications in the past.  Currently patient is not taking any medications at home and he has been noncompliance of the needed treatment medical and psychiatric and substance abuse treatments.  Past Medical History:   Diagnosis Date    AAA (abdominal aortic aneurysm) (HCC)     Femoral DVT (deep venous thrombosis) (HCC)     Hypertension     Stroke (HCC)      Past Surgical History:   Procedure Laterality Date    APPENDECTOMY      CEREBRAL ANEURYSM REPAIR      HERNIA REPAIR       Social History   Social History     Substance and Sexual Activity   Alcohol Use Yes    Comment: daily drinker, half handle of ismael daily     Social History     Substance and Sexual Activity   Drug Use Never     Social History     Tobacco Use   Smoking Status Former    Current packs/day: 2.00    Types: Cigarettes   Smokeless Tobacco Former       Meds/Allergies   current meds:   Current Facility-Administered Medications:     acetaminophen (TYLENOL) tablet 650 mg, Q8H BELLA    amLODIPine (NORVASC) tablet 5 mg, Daily    [Held by provider] apixaban (ELIQUIS) tablet 5 mg, BID    folic acid (FOLVITE) tablet 1 mg, Daily    hydrALAZINE (APRESOLINE) injection 5 mg, Q6H PRN    losartan (COZAAR) tablet 50 mg, Daily    magnesium sulfate 2 g/50 mL IVPB (premix) 2 g, Once    multivitamin-minerals (CENTRUM) tablet 1 tablet, Daily    ondansetron (ZOFRAN) injection 4 mg, Q6H PRN    sodium chloride 0.9 % bolus 250 mL, Once    thiamine tablet 100 mg, Daily and PTA meds:    Medications Prior to Admission:     amLODIPine (NORVASC) 5 mg  "tablet    apixaban (ELIQUIS) 5 mg    losartan (COZAAR) 50 mg tablet  No Known Allergies    Objective   Vitals: Blood pressure 150/88, pulse 104, temperature 98.7 °F (37.1 °C), temperature source Temporal, resp. rate 18, height 5' 10\" (1.778 m), weight 99 kg (218 lb 4.1 oz), SpO2 94%.      Routine Lab Results:   Admission on 03/04/2025   Component Date Value Ref Range Status    WBC 03/04/2025 8.14  4.31 - 10.16 Thousand/uL Final    RBC 03/04/2025 5.15  3.88 - 5.62 Million/uL Final    Hemoglobin 03/04/2025 17.4 (H)  12.0 - 17.0 g/dL Final    Hematocrit 03/04/2025 50.1 (H)  36.5 - 49.3 % Final    MCV 03/04/2025 97  82 - 98 fL Final    MCH 03/04/2025 33.8  26.8 - 34.3 pg Final    MCHC 03/04/2025 34.7  31.4 - 37.4 g/dL Final    RDW 03/04/2025 13.4  11.6 - 15.1 % Final    MPV 03/04/2025 9.9  8.9 - 12.7 fL Final    Platelets 03/04/2025 206  149 - 390 Thousands/uL Final    nRBC 03/04/2025 0  /100 WBCs Final    Segmented % 03/04/2025 58  43 - 75 % Final    Immature Grans % 03/04/2025 0  0 - 2 % Final    Lymphocytes % 03/04/2025 35  14 - 44 % Final    Monocytes % 03/04/2025 6  4 - 12 % Final    Eosinophils Relative 03/04/2025 1  0 - 6 % Final    Basophils Relative 03/04/2025 0  0 - 1 % Final    Absolute Neutrophils 03/04/2025 4.75  1.85 - 7.62 Thousands/µL Final    Absolute Immature Grans 03/04/2025 0.02  0.00 - 0.20 Thousand/uL Final    Absolute Lymphocytes 03/04/2025 2.83  0.60 - 4.47 Thousands/µL Final    Absolute Monocytes 03/04/2025 0.47  0.17 - 1.22 Thousand/µL Final    Eosinophils Absolute 03/04/2025 0.04  0.00 - 0.61 Thousand/µL Final    Basophils Absolute 03/04/2025 0.03  0.00 - 0.10 Thousands/µL Final    Protime 03/04/2025 13.0  12.3 - 15.0 seconds Final    INR 03/04/2025 0.93  0.85 - 1.19 Final    PTT 03/04/2025 24  23 - 34 seconds Final    Therapeutic Heparin Range =  60-90 seconds    Sodium 03/04/2025 136  135 - 147 mmol/L Final    Potassium 03/04/2025 5.5 (H)  3.5 - 5.3 mmol/L Final    Moderately " Hemolyzed:Results may be affected.    Chloride 03/04/2025 102  96 - 108 mmol/L Final    CO2 03/04/2025 20 (L)  21 - 32 mmol/L Final    ANION GAP 03/04/2025 14 (H)  4 - 13 mmol/L Final    BUN 03/04/2025 7  5 - 25 mg/dL Final    Creatinine 03/04/2025 0.56 (L)  0.60 - 1.30 mg/dL Final    Standardized to IDMS reference method    Glucose 03/04/2025 121  65 - 140 mg/dL Final    If the patient is fasting, the ADA then defines impaired fasting glucose as > 100 mg/dL and diabetes as > or equal to 123 mg/dL.    Calcium 03/04/2025 8.7  8.4 - 10.2 mg/dL Final    AST 03/04/2025 110 (H)  13 - 39 U/L Final    Moderately Hemolyzed:Results may be affected.    ALT 03/04/2025 88 (H)  7 - 52 U/L Final    Specimen collection should occur prior to Sulfasalazine administration due to the potential for falsely depressed results.     Alkaline Phosphatase 03/04/2025 96  34 - 104 U/L Final    Total Protein 03/04/2025 7.8  6.4 - 8.4 g/dL Final    Moderately Hemolyzed:Results may be affected.    Albumin 03/04/2025 4.2  3.5 - 5.0 g/dL Final    Moderately Hemolyzed:Results may be affected.    Total Bilirubin 03/04/2025 0.93  0.20 - 1.00 mg/dL Final    Use of this assay is not recommended for patients undergoing treatment with eltrombopag due to the potential for falsely elevated results.  N-acetyl-p-benzoquinone imine (metabolite of Acetaminophen) will generate erroneously low results in samples for patients that have taken an overdose of Acetaminophen.    eGFR 03/04/2025 109  ml/min/1.73sq m Final    Lipase 03/04/2025 26  11 - 82 u/L Final    Phosphorus 03/04/2025 1.7 (L)  2.3 - 4.1 mg/dL Final    Ethanol Lvl 03/04/2025 15 (H)  <10 mg/dL Final    Sodium 03/04/2025 138  135 - 147 mmol/L Final    Potassium 03/04/2025 3.5  3.5 - 5.3 mmol/L Final    Chloride 03/04/2025 102  96 - 108 mmol/L Final    CO2 03/04/2025 21  21 - 32 mmol/L Final    ANION GAP 03/04/2025 15 (H)  4 - 13 mmol/L Final    BUN 03/04/2025 8  5 - 25 mg/dL Final    Creatinine  03/04/2025 0.64  0.60 - 1.30 mg/dL Final    Standardized to IDMS reference method    Glucose 03/04/2025 127  65 - 140 mg/dL Final    If the patient is fasting, the ADA then defines impaired fasting glucose as > 100 mg/dL and diabetes as > or equal to 123 mg/dL.    Calcium 03/04/2025 8.6  8.4 - 10.2 mg/dL Final    AST 03/04/2025 73 (H)  13 - 39 U/L Final    ALT 03/04/2025 75 (H)  7 - 52 U/L Final    Specimen collection should occur prior to Sulfasalazine administration due to the potential for falsely depressed results.     Alkaline Phosphatase 03/04/2025 93  34 - 104 U/L Final    Total Protein 03/04/2025 6.9  6.4 - 8.4 g/dL Final    Albumin 03/04/2025 3.8  3.5 - 5.0 g/dL Final    Total Bilirubin 03/04/2025 0.91  0.20 - 1.00 mg/dL Final    Use of this assay is not recommended for patients undergoing treatment with eltrombopag due to the potential for falsely elevated results.  N-acetyl-p-benzoquinone imine (metabolite of Acetaminophen) will generate erroneously low results in samples for patients that have taken an overdose of Acetaminophen.    eGFR 03/04/2025 103  ml/min/1.73sq m Final    Sodium 03/05/2025 137  135 - 147 mmol/L Final    Potassium 03/05/2025 3.5  3.5 - 5.3 mmol/L Final    Chloride 03/05/2025 102  96 - 108 mmol/L Final    CO2 03/05/2025 21  21 - 32 mmol/L Final    ANION GAP 03/05/2025 14 (H)  4 - 13 mmol/L Final    BUN 03/05/2025 10  5 - 25 mg/dL Final    Creatinine 03/05/2025 0.64  0.60 - 1.30 mg/dL Final    Standardized to IDMS reference method    Glucose 03/05/2025 100  65 - 140 mg/dL Final    If the patient is fasting, the ADA then defines impaired fasting glucose as > 100 mg/dL and diabetes as > or equal to 123 mg/dL.    Calcium 03/05/2025 8.5  8.4 - 10.2 mg/dL Final    AST 03/05/2025 62 (H)  13 - 39 U/L Final    ALT 03/05/2025 65 (H)  7 - 52 U/L Final    Specimen collection should occur prior to Sulfasalazine administration due to the potential for falsely depressed results.     Alkaline  Phosphatase 03/05/2025 88  34 - 104 U/L Final    Total Protein 03/05/2025 6.8  6.4 - 8.4 g/dL Final    Albumin 03/05/2025 3.7  3.5 - 5.0 g/dL Final    Total Bilirubin 03/05/2025 1.53 (H)  0.20 - 1.00 mg/dL Final    Use of this assay is not recommended for patients undergoing treatment with eltrombopag due to the potential for falsely elevated results.  N-acetyl-p-benzoquinone imine (metabolite of Acetaminophen) will generate erroneously low results in samples for patients that have taken an overdose of Acetaminophen.    eGFR 03/05/2025 103  ml/min/1.73sq m Final    Lipase 03/05/2025 25  11 - 82 u/L Final    WBC 03/05/2025 7.46  4.31 - 10.16 Thousand/uL Final    RBC 03/05/2025 4.80  3.88 - 5.62 Million/uL Final    Hemoglobin 03/05/2025 16.3  12.0 - 17.0 g/dL Final    Hematocrit 03/05/2025 47.2  36.5 - 49.3 % Final    MCV 03/05/2025 98  82 - 98 fL Final    MCH 03/05/2025 34.0  26.8 - 34.3 pg Final    MCHC 03/05/2025 34.5  31.4 - 37.4 g/dL Final    RDW 03/05/2025 13.4  11.6 - 15.1 % Final    MPV 03/05/2025 10.5  8.9 - 12.7 fL Final    Platelets 03/05/2025 171  149 - 390 Thousands/uL Final    nRBC 03/05/2025 0  /100 WBCs Final    Segmented % 03/05/2025 59  43 - 75 % Final    Immature Grans % 03/05/2025 0  0 - 2 % Final    Lymphocytes % 03/05/2025 29  14 - 44 % Final    Monocytes % 03/05/2025 10  4 - 12 % Final    Eosinophils Relative 03/05/2025 1  0 - 6 % Final    Basophils Relative 03/05/2025 1  0 - 1 % Final    Absolute Neutrophils 03/05/2025 4.44  1.85 - 7.62 Thousands/µL Final    Absolute Immature Grans 03/05/2025 0.03  0.00 - 0.20 Thousand/uL Final    Absolute Lymphocytes 03/05/2025 2.15  0.60 - 4.47 Thousands/µL Final    Absolute Monocytes 03/05/2025 0.71  0.17 - 1.22 Thousand/µL Final    Eosinophils Absolute 03/05/2025 0.07  0.00 - 0.61 Thousand/µL Final    Basophils Absolute 03/05/2025 0.06  0.00 - 0.10 Thousands/µL Final    Magnesium 03/05/2025 1.5 (L)  1.9 - 2.7 mg/dL Final         Diagnosis: Major depression  recurrent  Alcohol abuse dependent  Anxiety  Insomnia  Rule out pathological bereavement  Noncompliance of treatment.    Plan: To the best of my knowledge patient is mentally competent at this time he is capable to understand his problems limitations and needs and consequences without the recommended treatments he is capable to make the best decision for his welfare and finances at this time.  Remeron 15 mg at bedtime  Observe for alcohol withdrawal.  I will recommend inpatient alcohol rehab after medical clearance  Patient will need outpatient psychiatric and substance counseling treatment after the discharge from the rehab.  I discussed with the patient and he agreed to comply with the needed medical and psychiatric treatment after the discharge.  Discussed with the daughter in person discussed with the nurse and .  Thank you very much if needed do not hesitate to reconsult.    Jonn Gerard MD

## 2025-03-05 NOTE — ASSESSMENT & PLAN NOTE
Patient states he has not been taking his Eliquis the last 2 months due to cost  Eliquis 5 mg ID on hold in the setting of large facial hematoma  Case management consulted appreciate recommendations help obtaining medications  Denies any shortness of breath, lower leg swelling, or pain  Encourage medication compliance  Defer to PCP for further follow-up

## 2025-03-05 NOTE — ASSESSMENT & PLAN NOTE
POA s/p fall and hitting face on the ground.  Patient states he was walking around his house mechanically tripped and fell onto his face.  Denies loss of consciousness.  His daughter came today and saw his face and how badly he was bruised and told him he had to come to the ED  He admits he drinks daily half a bottle of whiskey.  Last drink was yesterday afternoon  CT of the head: No acute intracranial abnormality.Large extracranial hematoma superficial to the frontal bone, left greater than right extending inferiorly into the facial soft tissues. A portion of this examination is degraded by artifact from patient's previously placed aneurysm coils.  CT of the spine. No cervical spine fracture or traumatic malalignment.  Suspect fall 2/2/ alcohol use and mechanical  Plan  PT /OT consulted  Alcohol cessation-patient is considering going to detox and accept help  Fall precautions  Neurochecks every 4

## 2025-03-05 NOTE — ASSESSMENT & PLAN NOTE
POA s/p fall and hitting face on the ground.  Patient states he was walking around his house mechanically tripped and fell onto his face.  Denies loss of consciousness.  His daughter came today and saw his face and how badly he was bruised and told him he had to come to the ED  He admits he drinks daily half a bottle of whiskey.  Last drink was yesterday afternoon  CT of the head: No acute intracranial abnormality.Large extracranial hematoma superficial to the frontal bone, left greater than right extending inferiorly into the facial soft tissues. A portion of this examination is degraded by artifact from patient's previously placed aneurysm coils.  CT of the spine. No cervical spine fracture or traumatic malalignment.  Suspect fall 2/2/ alcohol use and mechanical  Plan  PT /OT eval and treat  Alcohol cessation-patient is considering going to detox and accept help  Fall precautions

## 2025-03-05 NOTE — ASSESSMENT & PLAN NOTE
POA s/p fall and hitting face on the ground.  Patient states he was walking around his house mechanically tripped and fell onto his face.  Denies loss of consciousness.  His daughter came today and saw his face and told him he had to come to the ED  He admits he drinks daily half a bottle of whiskey.  Last drink was yesterday afternoon.   Noted in the ED, periorbital ecchymosis: large facial hematoma on left side of face, unable to open left eye & and minimal vision out of right eye due to swelling & ecchymosis  (See media)  CT of facial bones without contrast: Extracranial hematoma described in detail above with no facial fracture identified.   Plan  Monitor facial swelling  Home med eliquis on hold insetting large hematoma  See above plan

## 2025-03-05 NOTE — DISCHARGE SUMMARY
Discharge Summary - Hospitalist   Name: Erich Barcenas 64 y.o. male I MRN: [id number]  Unit/Bed#: [address] [place] [address] Date of Admission: 3/4/2025   Date of Service: 3/5/2025 I Hospital Day: 0     Assessment & Plan  Fall  POA s/p fall and hitting face on the ground.  Patient states he was walking around his house mechanically tripped and fell onto his face.  Denies loss of consciousness.  His daughter came today and saw his face and how badly he was bruised and told him he had to come to the ED  He admits he drinks daily half a bottle of whiskey.  Last drink was yesterday afternoon  CT of the head: No acute intracranial abnormality.Large extracranial hematoma superficial to the frontal bone, left greater than right extending inferiorly into the facial soft tissues. A portion of this examination is degraded by artifact from patient's previously placed aneurysm coils.  CT of the spine. No cervical spine fracture or traumatic malalignment.  Suspect fall 2/2/ alcohol use and mechanical  Plan  PT /OT eval and treat  Alcohol cessation-patient is considering going to detox and accept help  Fall precautions    Facial hematoma  POA s/p fall and hitting face on the ground.  Patient states he was walking around his house mechanically tripped and fell onto his face.  Denies loss of consciousness.  His daughter came today and saw his face and told him he had to come to the ED  He admits he drinks daily half a bottle of whiskey.  Last drink was yesterday afternoon.   Noted in the ED, periorbital ecchymosis: large facial hematoma on left side of face, unable to open left eye & and minimal vision out of right eye due to swelling & ecchymosis  (See media)  CT of facial bones without contrast: Extracranial hematoma described in detail above with no facial fracture identified.   Plan  Monitor facial swelling  Apply icing  Tylenol as needed  Home med eliquis on hold insetting large hematoma  See above plan  Alcohol abuse  admits he drinks  daily half a bottle of whiskey.  Last drink was yesterday afternoon.  Has never withdrawn from alcohol and no history of seizures  Ethanol level 15  [facility] protocol  Multivitamin thiamine folic acid initiated  Encouraged alcohol cessation-patient is interested in detox, case management consulted  Patient is accepted at detox unit.  Will be transported by family member to detox unit today.  Abnormal LFTs  POA elevated  & ALT 88  ALk phos 96  Lipase 26  Etiology 2/2 etoh abuse/chronic usage  Denies any abd pain  Encouraged alcohol cessation  Patient follow-up with PCP  Acute deep vein thrombosis (DVT) of femoral vein of right lower extremity (HCC)  Patient states he has not been taking his Eliquis the last 2 months due to cost  Eliquis 5 mg ID on hold in the setting of large facial hematoma  Case management consulted appreciate recommendations help obtaining medications  Denies any shortness of breath, lower leg swelling, or pain  Encourage medication compliance  Defer to PCP for further follow-up  Essential hypertension  Blood pressure admission 150/86  Per patient has not taken his blood pressure medications constantly due to cost  Home medications amlodipine and losartan continued  Encourage compliance with medication  Chronic bronchitis with emphysema (HCC)  Not in acute exacerbation  Pulmonary hygiene  Peripheral artery disease (HCC)  Past medical history noted.  Infrarenal abdominal aortic aneurysm (AAA) without rupture (HCC)  History noted.  History of cerebral aneurysm  History noted.  Hypomagnesemia  S/p repletion 3/5/2025     Medical Problems       Resolved Problems  Date Reviewed: 11/15/2024   None       Discharging Physician / Practitioner: Rylie Brown MD  PCP: James Queen DO  Admission Date:   Admission Orders (From admission, onward)       Ordered        03/04/25 2112  Place in Observation  Once            03/04/25 2052  Place in Observation  Once,   Status:  Canceled                           Discharge Date: 03/05/25    Consultations During Hospital Stay:  CM       Procedures Performed:   CT head without contrast   Final Result      No acute intracranial abnormality.      Large extracranial hematoma superficial to the frontal bone, left greater than right extending inferiorly into the facial soft tissues.      A portion of this examination is degraded by artifact from patient's previously placed aneurysm coils.                  Workstation performed: [id number]         CT cervical spine without contrast   Final Result      No cervical spine fracture or traumatic malalignment.                  Workstation performed: [id number]         CT facial bones wo contrast   Final Result      Extracranial hematoma described in detail above with no facial fracture identified.               Workstation performed: [id number]               Significant Findings / Test Results:   Results for orders placed or performed during the hospital encounter of 03/04/25   CBC and differential   Result Value Ref Range    WBC 8.14 4.31 - 10.16 Thousand/uL    RBC 5.15 3.88 - 5.62 Million/uL    Hemoglobin 17.4 (H) 12.0 - 17.0 g/dL    Hematocrit 50.1 (H) 36.5 - 49.3 %    MCV 97 82 - 98 fL    MCH 33.8 26.8 - 34.3 pg    MCHC 34.7 31.4 - 37.4 g/dL    RDW 13.4 11.6 - 15.1 %    MPV 9.9 8.9 - 12.7 fL    Platelets 206 149 - 390 Thousands/uL    nRBC 0 /100 WBCs    Segmented % 58 43 - 75 %    Immature Grans % 0 0 - 2 %    Lymphocytes % 35 14 - 44 %    Monocytes % 6 4 - 12 %    Eosinophils Relative 1 0 - 6 %    Basophils Relative 0 0 - 1 %    Absolute Neutrophils 4.75 1.85 - 7.62 Thousands/µL    Absolute Immature Grans 0.02 0.00 - 0.20 Thousand/uL    Absolute Lymphocytes 2.83 0.60 - 4.47 Thousands/µL    Absolute Monocytes 0.47 0.17 - 1.22 Thousand/µL    Eosinophils Absolute 0.04 0.00 - 0.61 Thousand/µL    Basophils Absolute 0.03 0.00 - 0.10 Thousands/µL   Protime-INR   Result Value Ref Range    Protime 13.0 12.3 - 15.0 seconds    INR 0.93  0.85 - 1.19   APTT   Result Value Ref Range    PTT 24 23 - 34 seconds   Comprehensive metabolic panel   Result Value Ref Range    Sodium 136 135 - 147 mmol/L    Potassium 5.5 (H) 3.5 - 5.3 mmol/L    Chloride 102 96 - 108 mmol/L    CO2 20 (L) 21 - 32 mmol/L    ANION GAP 14 (H) 4 - 13 mmol/L    BUN 7 5 - 25 mg/dL    Creatinine 0.56 (L) 0.60 - 1.30 mg/dL    Glucose 121 65 - 140 mg/dL    Calcium 8.7 8.4 - 10.2 mg/dL     (H) 13 - 39 U/L    ALT 88 (H) 7 - 52 U/L    Alkaline Phosphatase 96 34 - 104 U/L    Total Protein 7.8 6.4 - 8.4 g/dL    Albumin 4.2 3.5 - 5.0 g/dL    Total Bilirubin 0.93 0.20 - 1.00 mg/dL    eGFR 109 ml/min/1.73sq m   Lipase   Result Value Ref Range    Lipase 26 11 - 82 u/L   Phosphorus   Result Value Ref Range    Phosphorus 1.7 (L) 2.3 - 4.1 mg/dL   Ethanol (Medical Alcohol)   Result Value Ref Range    Ethanol Lvl 15 (H) <10 mg/dL   Comprehensive metabolic panel   Result Value Ref Range    Sodium 138 135 - 147 mmol/L    Potassium 3.5 3.5 - 5.3 mmol/L    Chloride 102 96 - 108 mmol/L    CO2 21 21 - 32 mmol/L    ANION GAP 15 (H) 4 - 13 mmol/L    BUN 8 5 - 25 mg/dL    Creatinine 0.64 0.60 - 1.30 mg/dL    Glucose 127 65 - 140 mg/dL    Calcium 8.6 8.4 - 10.2 mg/dL    AST 73 (H) 13 - 39 U/L    ALT 75 (H) 7 - 52 U/L    Alkaline Phosphatase 93 34 - 104 U/L    Total Protein 6.9 6.4 - 8.4 g/dL    Albumin 3.8 3.5 - 5.0 g/dL    Total Bilirubin 0.91 0.20 - 1.00 mg/dL    eGFR 103 ml/min/1.73sq m   Comprehensive metabolic panel   Result Value Ref Range    Sodium 137 135 - 147 mmol/L    Potassium 3.5 3.5 - 5.3 mmol/L    Chloride 102 96 - 108 mmol/L    CO2 21 21 - 32 mmol/L    ANION GAP 14 (H) 4 - 13 mmol/L    BUN 10 5 - 25 mg/dL    Creatinine 0.64 0.60 - 1.30 mg/dL    Glucose 100 65 - 140 mg/dL    Calcium 8.5 8.4 - 10.2 mg/dL    AST 62 (H) 13 - 39 U/L    ALT 65 (H) 7 - 52 U/L    Alkaline Phosphatase 88 34 - 104 U/L    Total Protein 6.8 6.4 - 8.4 g/dL    Albumin 3.7 3.5 - 5.0 g/dL    Total Bilirubin 1.53 (H) 0.20  - 1.00 mg/dL    eGFR 103 ml/min/1.73sq m   Lipase   Result Value Ref Range    Lipase 25 11 - 82 u/L   CBC and differential   Result Value Ref Range    WBC 7.46 4.31 - 10.16 Thousand/uL    RBC 4.80 3.88 - 5.62 Million/uL    Hemoglobin 16.3 12.0 - 17.0 g/dL    Hematocrit 47.2 36.5 - 49.3 %    MCV 98 82 - 98 fL    MCH 34.0 26.8 - 34.3 pg    MCHC 34.5 31.4 - 37.4 g/dL    RDW 13.4 11.6 - 15.1 %    MPV 10.5 8.9 - 12.7 fL    Platelets 171 149 - 390 Thousands/uL    nRBC 0 /100 WBCs    Segmented % 59 43 - 75 %    Immature Grans % 0 0 - 2 %    Lymphocytes % 29 14 - 44 %    Monocytes % 10 4 - 12 %    Eosinophils Relative 1 0 - 6 %    Basophils Relative 1 0 - 1 %    Absolute Neutrophils 4.44 1.85 - 7.62 Thousands/µL    Absolute Immature Grans 0.03 0.00 - 0.20 Thousand/uL    Absolute Lymphocytes 2.15 0.60 - 4.47 Thousands/µL    Absolute Monocytes 0.71 0.17 - 1.22 Thousand/µL    Eosinophils Absolute 0.07 0.00 - 0.61 Thousand/µL    Basophils Absolute 0.06 0.00 - 0.10 Thousands/µL   Magnesium   Result Value Ref Range    Magnesium 1.5 (L) 1.9 - 2.7 mg/dL         Incidental Findings:   None    I reviewed the above mentioned incidental findings with the patient and/or family and they expressed understanding.    Test Results Pending at Discharge (will require follow up):   None      Outpatient Tests Requested:  None     Complications:  none     Reason for Admission: S/p fall with alcohol use disorder    Hospital Course:   Erich Barcenas is a 64 y.o. male patient who originally presented to the hospital on 3/4/2025 due to s/p fall with alcohol use disorder .  Noted to have extracranial hematoma along his forehead and around his eyes bilaterally with wrinkling sign.  Serial CT scans with no active intracranial hemorrhage or fractures.  Patient remains vitally stable and alert and oriented x 3.  Patient endorses drinking half a bottle of alcohol daily.  He is interested along with his family and inpatient rehab.    He is currently medically  "cleared to be transferred to detox unit at this time.  Strongly encouraged him to follow-up with PCP after discharge from detox.  During the hospitalization electrolytes were repleted.  We recommend icing/Tylenol for preorbital hematoma          The patient, initially admitted to the hospital as inpatient, was discharged earlier than expected given the following: Medically cleared for transfer to detox unit.  Please see above list of diagnoses and related plan for additional information.     Condition at Discharge: good    Discharge Day Visit / Exam:   Subjective: Patient seen today at bedside.  He is very cooperative.  Alert and oriented x 3.  Reports remark improvement in swelling of his eyelids.  Currently he is able to see out of both eyes.  Denies any headaches or lightheadedness or dizziness.  No nausea or vomiting, diarrhea or constipation or any active respiratory symptoms.    Reports recurrent falls and alcohol use disorder he drinks half a bottle of alcohol daily.  I had a lengthy discussion with the patient at the presents of his daughter and the second daughter via phone and we agreed on sending the patient to detox unit.  Patient agrees at this time.  He is interested in stopping alcohol with help from detox unit.    Vitals: Blood Pressure: 150/88 (03/05/25 0838)  Pulse: 104 (03/05/25 0838)  Temperature: 98.7 °F (37.1 °C) (03/05/25 0838)  Temp Source: Temporal (03/05/25 0838)  Respirations: 18 (03/05/25 0838)  Height: 5' 10\" (177.8 cm) (03/04/25 2300)  Weight - Scale: 99 kg (218 lb 4.1 oz) (03/04/25 2300)  SpO2: 94 % (03/05/25 0838)  Physical Exam  Vitals reviewed.   Constitutional:       General: He is not in acute distress.     Appearance: Normal appearance.   HENT:      Head: Normocephalic. Raccoon eyes present.      Comments: Swelling/bruising with ecchymosis noted around both eyes.  Vision intact.  Nontender to palpation.     Mouth/Throat:      Mouth: Mucous membranes are moist.   Eyes:      " Conjunctiva/sclera: Conjunctivae normal.      Pupils: Pupils are equal, round, and reactive to light.   Cardiovascular:      Rate and Rhythm: Normal rate and regular rhythm.      Pulses: Normal pulses.           Carotid pulses are 2+ on the right side and 2+ on the left side.       Radial pulses are 2+ on the right side and 2+ on the left side.        Dorsalis pedis pulses are 2+ on the right side and 2+ on the left side.      Heart sounds: Normal heart sounds, S1 normal and S2 normal. No murmur heard.  Pulmonary:      Effort: No tachypnea, bradypnea or accessory muscle usage.      Breath sounds: Normal breath sounds and air entry. No decreased breath sounds, wheezing, rhonchi or rales.   Abdominal:      General: Abdomen is flat. Bowel sounds are normal. There is no distension.      Palpations: Abdomen is soft.      Tenderness: There is no abdominal tenderness.   Musculoskeletal:      Right lower leg: No edema.      Left lower leg: No edema.   Neurological:      Mental Status: He is alert and oriented to person, place, and time. Mental status is at baseline.   Psychiatric:         Mood and Affect: Mood normal.         Behavior: Behavior normal.          Discussion with Family: Updated  (daughter) at bedside.    Discharge instructions/Information to patient and family:   See after visit summary for information provided to patient and family.      Provisions for Follow-Up Care:  See after visit summary for information related to follow-up care and any pertinent home health orders.      Mobility at time of Discharge:   Basic Mobility Inpatient Raw Score: 18  JH-HLM Goal: 6: Walk 10 steps or more  JH-HLM Achieved: 6: Walk 10 steps or more  HLM Goal achieved. Continue to encourage appropriate mobility.     Disposition:   Other: Detox unit     Planned Readmission: none     Discharge Medications:  See after visit summary for reconciled discharge medications provided to patient and/or family.       Administrative Statements   Discharge Statement:  I have spent a total time of 45 minutes in caring for this patient on the day of the visit/encounter. >30 minutes of time was spent on: Diagnostic results, Prognosis, Risks and benefits of tx options, and Instructions for management.    **Please Note: This note may have been constructed using a voice recognition system**

## 2025-03-05 NOTE — ASSESSMENT & PLAN NOTE
POA elevated  & ALT 88  ALk phos 96  Lipase 26  Etiology 2/2 etoh abuse/chronic usage  Denies any abd pain  Monitor Liver function   Encouraged alcohol cessation

## 2025-03-05 NOTE — ASSESSMENT & PLAN NOTE
POA s/p fall and hitting face on the ground.  Patient states he was walking around his house mechanically tripped and fell onto his face.  Denies loss of consciousness.  His daughter came today and saw his face and told him he had to come to the ED  He admits he drinks daily half a bottle of whiskey.  Last drink was yesterday afternoon.   Noted in the ED, periorbital ecchymosis: large facial hematoma on left side of face, unable to open left eye & and minimal vision out of right eye due to swelling & ecchymosis  (See media)  CT of facial bones without contrast: Extracranial hematoma described in detail above with no facial fracture identified.   Plan  Monitor facial swelling  Apply icing  Tylenol as needed  Home med eliquis on hold insetting large hematoma  See above plan

## 2025-03-05 NOTE — H&P
H&P - Hospitalist   Name: Erich Barcenas 64 y.o. male I MRN: [id number]  Unit/Bed#: [address] [place] [address] Date of Admission: 3/4/2025   Date of Service: 3/4/2025 I Hospital Day: 0     Assessment & Plan  Fall  POA s/p fall and hitting face on the ground.  Patient states he was walking around his house mechanically tripped and fell onto his face.  Denies loss of consciousness.  His daughter came today and saw his face and how badly he was bruised and told him he had to come to the ED  He admits he drinks daily half a bottle of whiskey.  Last drink was yesterday afternoon  CT of the head: No acute intracranial abnormality.Large extracranial hematoma superficial to the frontal bone, left greater than right extending inferiorly into the facial soft tissues. A portion of this examination is degraded by artifact from patient's previously placed aneurysm coils.  CT of the spine. No cervical spine fracture or traumatic malalignment.  Suspect fall 2/2/ alcohol use and mechanical  Plan  PT /OT consulted  Alcohol cessation-patient is considering going to detox and accept help  Fall precautions  Neurochecks every 4  Facial hematoma  POA s/p fall and hitting face on the ground.  Patient states he was walking around his house mechanically tripped and fell onto his face.  Denies loss of consciousness.  His daughter came today and saw his face and told him he had to come to the ED  He admits he drinks daily half a bottle of whiskey.  Last drink was yesterday afternoon.   Noted in the ED, periorbital ecchymosis: large facial hematoma on left side of face, unable to open left eye & and minimal vision out of right eye due to swelling & ecchymosis  (See media)  CT of facial bones without contrast: Extracranial hematoma described in detail above with no facial fracture identified.   Plan  Monitor facial swelling  Home med eliquis on hold insetting large hematoma  See above plan  Alcohol abuse  admits he drinks daily half a bottle of whiskey.   Last drink was yesterday afternoon.  Has never withdrawn from alcohol and no history of seizures  Ethanol level 15  [facility] protocol  Multivitamin thiamine folic acid initiated  Encouraged alcohol cessation-patient is interested in detox, case management consulted  Monitor on telemetry  Abnormal LFTs  POA elevated  & ALT 88  ALk phos 96  Lipase 26  Etiology 2/2 etoh abuse/chronic usage  Denies any abd pain  Monitor Liver function   Encouraged alcohol cessation  Acute deep vein thrombosis (DVT) of femoral vein of right lower extremity (HCC)  Patient states he has not been taking his Eliquis the last 2 months due to cost  Eliquis 5 mg ID on hold in the setting of large facial hematoma  Case management consulted appreciate recommendations help obtaining medications  Denies any shortness of breath, lower leg swelling, or pain  Encourage medication compliance  Essential hypertension  Blood pressure admission 150/86  Per patient has not taken his blood pressure medications constantly due to cost  Home medications amlodipine and losartan continued  Encourage compliance with medication  Chronic bronchitis with emphysema (HCC)  Not in acute exacerbation  Pulmonary hygiene      VTE Pharmacologic Prophylaxis:   High Risk (Score >/= 5) - Pharmacological DVT Prophylaxis Contraindicated. Sequential Compression Devices Ordered.  Code Status: Level 1 - Full Code with patient   Discussion with family: Patient declined call to .     Anticipated Length of Stay: Patient will be admitted on an observation basis with an anticipated length of stay of less than 2 midnights secondary to post fall and requiring PT OT consulted and case management for possible detox placement.    History of Present Illness   Chief Complaint:  s/p fall and hitting face on the ground.  Unable to see out of his eyes    Erich Barcenas is a 64 y.o. male with a PMH of DVT, hypertension, alcohol abuse, AAA, who presents with fall and hitting his face  on the ground.  Patient states he fell onto his face after tripping over his own feet.  Landed on his face.  Denies any loss of consciousness or dizziness prior to the fall.  States he does drink half a bottle of whiskey daily.  And states his last drink was yesterday.  He is considering detox from alcohol.  He also states he has not taking any of his medications for the greater than 2 months now due to cost.  Patient states he is unable to see out of his left eye due to swelling and minimal vision out of his right and does not feel comfortable going home.  Per patient has never detox from alcohol before.  Does not have a history of seizures from alcohol.   Noted in ED elevated liver function,& CT of facial bones without contrast: Extracranial hematoma described in detail above with no facial fracture identified. Due to swelling of the face, recent fall, never detoxing from alcohol, &having minimal vision pt feels unsafe to go home.  Will admit for further medical management.  Discussed CODE STATUS with patient & patient is a full code    Review of Systems   Constitutional:  Negative for chills and fever.   HENT:  Positive for facial swelling. Negative for ear pain and sore throat.    Eyes:  Positive for visual disturbance. Negative for pain.   Respiratory:  Negative for cough and shortness of breath.    Cardiovascular:  Negative for chest pain and palpitations.   Gastrointestinal:  Negative for abdominal pain and vomiting.   Genitourinary:  Negative for dysuria and hematuria.   Musculoskeletal:  Negative for arthralgias and back pain.   Skin:  Negative for color change and rash.   Neurological:  Negative for seizures and syncope.   All other systems reviewed and are negative.      Historical Information   Past Medical History:   Diagnosis Date    AAA (abdominal aortic aneurysm) (HCC)     Femoral DVT (deep venous thrombosis) (HCC)     Hypertension     Stroke (HCC)      Past Surgical History:   Procedure Laterality  Date    APPENDECTOMY      CEREBRAL ANEURYSM REPAIR      HERNIA REPAIR       Social History     Tobacco Use    Smoking status: Former     Current packs/day: 2.00     Types: Cigarettes    Smokeless tobacco: Former   Vaping Use    Vaping status: Former   Substance and Sexual Activity    Alcohol use: Yes     Comment: daily drinker, half handle of ismael daily    Drug use: Never    Sexual activity: Not on file     E-Cigarette/Vaping    E-Cigarette Use Former User      E-Cigarette/Vaping Substances    Nicotine No     THC No     CBD No     Flavoring No     Other No     Unknown No        Social History:  Marital Status: Unknown   Occupation: Retired  Patient Pre-hospital Living Situation: Home  Patient Pre-hospital Level of Mobility: walks  Patient Pre-hospital Diet Restrictions: none    Meds/Allergies   I have reviewed home medications with patient personally.  Prior to Admission medications    Medication Sig Start Date End Date Taking? Authorizing Provider   amLODIPine (NORVASC) 5 mg tablet Take 1 tablet (5 mg total) by mouth daily 10/15/24   James Queen DO   apixaban (ELIQUIS) 5 mg Take 1 tablet (5 mg total) by mouth 2 (two) times a day 3/1/24   James Queen DO   losartan (COZAAR) 50 mg tablet Take 1 tablet (50 mg total) by mouth daily 10/15/24   James Queen DO     No Known Allergies    Objective :  Temp:  [98.4 °F (36.9 °C)-98.8 °F (37.1 °C)] 98.8 °F (37.1 °C)  HR:  [] 110  BP: (133-164)/() 164/91  Resp:  [12-20] 18  SpO2:  [95 %-98 %] 95 %  O2 Device: None (Room air)    Physical Exam  Vitals and nursing note reviewed.   Constitutional:       General: He is not in acute distress.     Appearance: He is well-developed.   HENT:      Head: Normocephalic. Raccoon eyes present.   Eyes:      Conjunctiva/sclera: Conjunctivae normal.   Cardiovascular:      Rate and Rhythm: Normal rate and regular rhythm.      Heart sounds: No murmur heard.  Pulmonary:      Effort: Pulmonary effort  is normal. No respiratory distress.      Breath sounds: Normal breath sounds.   Abdominal:      Palpations: Abdomen is soft.      Tenderness: There is no abdominal tenderness.   Musculoskeletal:         General: No swelling.      Cervical back: Neck supple.   Skin:     General: Skin is warm and dry.      Capillary Refill: Capillary refill takes less than 2 seconds.   Neurological:      Mental Status: He is alert.   Psychiatric:         Mood and Affect: Mood normal.          Lines/Drains:            Lab Results: I have reviewed the following results:  Results from last 7 days   Lab Units 03/04/25  1742   WBC Thousand/uL 8.14   HEMOGLOBIN g/dL 17.4*   HEMATOCRIT % 50.1*   PLATELETS Thousands/uL 206   SEGS PCT % 58   LYMPHO PCT % 35   MONO PCT % 6   EOS PCT % 1     Results from last 7 days   Lab Units 03/04/25  1742   SODIUM mmol/L 136   POTASSIUM mmol/L 5.5*   CHLORIDE mmol/L 102   CO2 mmol/L 20*   BUN mg/dL 7   CREATININE mg/dL 0.56*   ANION GAP mmol/L 14*   CALCIUM mg/dL 8.7   ALBUMIN g/dL 4.2   TOTAL BILIRUBIN mg/dL 0.93   ALK PHOS U/L 96   ALT U/L 88*   AST U/L 110*   GLUCOSE RANDOM mg/dL 121     Results from last 7 days   Lab Units 03/04/25  1742   INR  0.93         Lab Results   Component Value Date    HGBA1C 5.5 10/23/2024           Imaging Results Review: I reviewed radiology reports from this admission including: CT head.  CT of the spine cervical without contrast and CT a shoulder bones without contrast   Other Study Results Review: No additional pertinent studies reviewed.    Administrative Statements       ** Please Note: This note has been constructed using a voice recognition system. **

## 2025-03-05 NOTE — CASE MANAGEMENT
Case Management Assessment & Discharge Planning Note    Patient name Erich Barcenas  Location [address] [address]/[address] [address]-* MRN [id number]  : 1960 Date 3/5/2025       Current Admission Date: 3/4/2025  Current Admission Diagnosis:Fall   Patient Active Problem List    Diagnosis Date Noted Date Diagnosed    Hypomagnesemia 2025     Facial hematoma 2025     Fall 2025     Alcohol abuse 11/15/2024     Abnormal LFTs 11/15/2024     History of DVT (deep vein thrombosis) 10/15/2024     Subclinical hypothyroidism 2023     Peripheral artery disease (HCC) 2023     Infrarenal abdominal aortic aneurysm (AAA) without rupture (HCC) 2023     Chronic bronchitis with emphysema (HCC) 2023     History of cerebral aneurysm 2023     Acute deep vein thrombosis (DVT) of femoral vein of right lower extremity (HCC) 11/10/2023     Essential hypertension 2017       LOS (days): 0  Geometric Mean LOS (GMLOS) (days):   Days to GMLOS:     OBJECTIVE:            Current admission status: Observation  Referral Reason: Drug/Alcohol Abuse, Rehab    Preferred Pharmacy:   [facility] [address] [place] [address] - [facility]  [address]  [facility] [address]  Phone: [phone] Fax: [phone]    Primary Care Provider: James Queen DO    Primary Insurance: HEALTHCARE ASSISTANCE PENDING  Secondary Insurance:     ASSESSMENT:  Active Health Care Proxies    There are no active Health Care Proxies on file.        Obs Notice Signed: 25    Readmission Root Cause  30 Day Readmission: No    Patient Information  Admitted from:: Home  Mental Status: Alert  During Assessment patient was accompanied by: Daughter  Assessment information provided by:: Daughter, Patient  Primary Caregiver: Self  Support Systems: Self, Daughter  County [place] Residence: [place]  What city do you live in?: [place]  Living Arrangements: Lives Alone    Activities of Daily Living Prior to  Admission  Functional Status: Independent  Completes ADLs independently?: Yes  Ambulates independently?: Yes  Does patient use assisted devices?: No  Does patient currently own DME?: No  Does patient have a history of Outpatient Therapy (PT/OT)?: No  Does the patient have a history of Short-Term Rehab?: No  Does patient have a history of HHC?: No  Does patient currently have HHC?: No     Patient Information Continued  Income Source: Unemployed  Does patient have prescription coverage?: No  Does patient receive dialysis treatments?: No     Means of Transportation  Means of Transport to Appts:: Family transport    DISCHARGE DETAILS:    Discharge planning discussed with:: Patient, and both daughters  Freedom of Choice: Yes  Comments - Freedom of Choice: IP alcohol rehab  CM contacted family/caregiver?: Yes  Were Treatment Team discharge recommendations reviewed with patient/caregiver?: Yes  Did patient/caregiver verbalize understanding of patient care needs?: Yes  Were patient/caregiver advised of the risks associated with not following Treatment Team discharge recommendations?: Yes    Contacts  Patient Contacts: Lana & Doreen (daughters)  Relationship to Patient:: Family  Contact Method: In Person, Phone  Phone Number: [phone]  Reason/Outcome: Discharge Planning, Emergency Contact, Continuity of Care    Requested Home Health Care         Is the patient interested in HHC at discharge?: No    DME Referral Provided  Referral made for DME?: No    Other Referral/Resources/Interventions Provided:  Interventions: Other (Specify) (IP alcohol rehab)  Referral Comments: RN CM received message to contact Faina Logan at [phone]. RN CM called and spoke with Doreen over phone and then with faina Schwartz at bedside to introduce role, and discuss discharge planning. RN CM made aware by daughters that patient is now agreeable for IP alcohol rehab. Both daughters and patient made aware that RN CM did call [facility] and spoke with Dinora  for referral and Dinora will be visiting patient shortly in the hospital to [facility] for inpatient alcohol rehab. RN CM also encouraged daughters to initiate the Medicaid application online. Patient and both daughters verbalized understanding. RN CM will continue to follow.     Treatment Team Recommendation:  (TBD)  Discharge Destination Plan::  (IP alcohol rehab pending acceptance)  Transport at Discharge : Family

## 2025-03-13 NOTE — ED PROVIDER NOTES
Time reflects when diagnosis was documented in both MDM as applicable and the Disposition within this note       Time User Action Codes Description Comment    3/4/2025  8:52 PM Yokubaitis, Noe Add [W19.XXXA] Fall, initial encounter     3/4/2025  8:52 PM Yokubaitis, Noe Add [H05.239] Periorbital hematoma     3/4/2025  8:52 PM Yokubaitis, Noe Add [R62.7] Failure to thrive in adult     3/5/2025  1:11 PM Rylie Brown Add [F10.10] Alcohol abuse           ED Disposition       ED Disposition   Admit    Condition   Stable    Date/Time   Tue Mar 4, 2025  8:52 PM    Comment   Case was discussed with moraima and the patient's admission status was agreed to be Admission Status: observation status to the service of moraima .               Assessment & Plan       Medical Decision Making  64M presenting to the ED du eto facial bruising/swelling after a fall. Cts obtained to evaluate for traumatic injuries and negative. Patient's daughter noting that he has had frequent falls and lives alone. They feel he is not safe to continue living without assistance. CM not on at this time. Discussed with moraima who will admit for potential placement vs resources given patient high risk for safety at home.     Amount and/or Complexity of Data Reviewed  Labs: ordered.  Radiology: ordered.    Risk  Decision regarding hospitalization.             Medications       ED Risk Strat Scores                                                History of Present Illness       Chief Complaint   Patient presents with    Fall     Pt informed had a trip and fall last night. Hit his head, was supposed to be on thinner but has not had medication for 3 months. Was drinking last night, unsure if LOC.       Past Medical History:   Diagnosis Date    AAA (abdominal aortic aneurysm) (HCC)     Femoral DVT (deep venous thrombosis) (HCC)     Hypertension     Stroke (HCC)       Past Surgical History:   Procedure Laterality Date    APPENDECTOMY      CEREBRAL ANEURYSM REPAIR       HERNIA REPAIR        Family History   Problem Relation Age of Onset    Heart disease Mother     Heart disease Father       Social History     Tobacco Use    Smoking status: Former     Current packs/day: 2.00     Types: Cigarettes    Smokeless tobacco: Former   Vaping Use    Vaping status: Former   Substance Use Topics    Alcohol use: Yes     Comment: daily drinker, half handle of ismael daily    Drug use: Never      E-Cigarette/Vaping    E-Cigarette Use Former User       E-Cigarette/Vaping Substances    Nicotine No     THC No     CBD No     Flavoring No     Other No     Unknown No       I have reviewed and agree with the history as documented.     64M presenting to the ED with a fall and facial bruising. Patient was intoxicated last night and fell, striking his face. His daughter visited him today and noted that his eyes were black/bruised and swollen to the point that he wasn't able to open his left eye. He also endorses pain in the back of his neck. No pain with EOM, vision changes, numbness, weakness, or other subjective complaints.         Review of Systems   All other systems reviewed and are negative.          Objective       ED Triage Vitals   Temperature Pulse Blood Pressure Respirations SpO2 Patient Position - Orthostatic VS   03/04/25 1730 03/04/25 1730 03/04/25 1728 03/04/25 1730 03/04/25 1730 03/04/25 1900   98.4 °F (36.9 °C) (!) 106 (!) 144/114 20 98 % Lying      Temp Source Heart Rate Source BP Location FiO2 (%) Pain Score    03/04/25 1730 03/04/25 1730 03/04/25 1900 -- 03/04/25 1730    Oral Monitor Right arm  8      Vitals      Date and Time Temp Pulse SpO2 Resp BP Pain Score FACES Pain Rating User   03/05/25 1400 -- -- -- -- -- -- 6 facial area, with ecchymosis from fall LRO   03/05/25 0850 -- -- -- -- -- 7 -- JG   03/05/25 0838 98.7 °F (37.1 °C) 104 94 % 18 150/88 -- -- ASM   03/05/25 0501 98.2 °F (36.8 °C) 92 95 % 18 -- -- -- MK   03/05/25 0501 -- -- -- -- 175/99 PRN Hydralazine administered -- --  DL   03/05/25 0300 -- -- -- -- -- 5 -- DL   03/04/25 2300 98.2 °F (36.8 °C) 105 96 % 18 155/86 3 -- DL   03/04/25 2149 -- -- -- -- -- 7 -- DL   03/04/25 2139 98.8 °F (37.1 °C) 110 95 % 18 164/91 -- -- SR   03/04/25 2123 -- -- 96 % -- -- -- -- DL   03/04/25 2100 -- 108 98 % 18 150/99 -- -- JH   03/04/25 2030 -- 97 98 % 12 140/74 -- -- JH   03/04/25 2000 -- 104 98 % 18 143/82 -- -- JH   03/04/25 1900 -- 102 98 % 16 151/77 -- -- JH   03/04/25 1830 -- 105 98 % 14 133/78 -- -- JH   03/04/25 1815 -- 104 98 % 13 150/86 -- -- JH   03/04/25 1754 -- -- -- -- -- 8 -- KR   03/04/25 1730 -- 106 98 % 20 -- 8 -- JCM   03/04/25 1730 98.4 °F (36.9 °C) -- -- -- -- -- -- KR   03/04/25 1728 -- -- -- -- 144/114 -- -- JCM            Physical Exam  Constitutional:       General: He is not in acute distress.     Appearance: Normal appearance. He is not ill-appearing or toxic-appearing.   HENT:      Head:      Comments: Diffuse facial bruising and periorbital swelling. Left eye unable to open spontaneously.      Mouth/Throat:      Mouth: Mucous membranes are moist.   Eyes:      Extraocular Movements: Extraocular movements intact.   Pulmonary:      Effort: Pulmonary effort is normal.   Abdominal:      Palpations: Abdomen is soft.   Skin:     General: Skin is warm.   Neurological:      Mental Status: He is alert.   Psychiatric:         Mood and Affect: Mood normal.         Results Reviewed       Procedure Component Value Units Date/Time    Lipase [[id number]]  (Normal) Collected: 03/04/25 2236    Lab Status: Final result Specimen: Blood from Arm, Right Updated: 03/04/25 2342     Lipase 26 u/L     Phosphorus [[id number]]  (Abnormal) Collected: 03/04/25 2236    Lab Status: Final result Specimen: Blood from Arm, Right Updated: 03/04/25 2342     Phosphorus 1.7 mg/dL     Comprehensive metabolic panel [[id number]]  (Abnormal) Collected: 03/04/25 2236    Lab Status: Final result Specimen: Blood from Arm, Right Updated: 03/04/25 2342     Sodium 138  mmol/L      Potassium 3.5 mmol/L      Chloride 102 mmol/L      CO2 21 mmol/L      ANION GAP 15 mmol/L      BUN 8 mg/dL      Creatinine 0.64 mg/dL      Glucose 127 mg/dL      Calcium 8.6 mg/dL      AST 73 U/L      ALT 75 U/L      Alkaline Phosphatase 93 U/L      Total Protein 6.9 g/dL      Albumin 3.8 g/dL      Total Bilirubin 0.91 mg/dL      eGFR 103 ml/min/1.73sq m     Narrative:      National Kid[employer] Foundation guidelines for Chronic Kidney Disease (CKD):     Stage 1 with normal or high GFR (GFR > 90 mL/min/1.73 square meters)    Stage 2 Mild CKD (GFR = 60-89 mL/min/1.73 square meters)    Stage 3A Moderate CKD (GFR = 45-59 mL/min/1.73 square meters)    Stage 3B Moderate CKD (GFR = 30-44 mL/min/1.73 square meters)    Stage 4 Severe CKD (GFR = 15-29 mL/min/1.73 square meters)    Stage 5 End Stage CKD (GFR <15 mL/min/1.73 square meters)  Note: GFR calculation is accurate only with a steady state creatinine    Ethanol (Medical Alcohol) [[id number]]  (Abnormal) Collected: 03/04/25 2236    Lab Status: Final result Specimen: Blood from Arm, Right Updated: 03/04/25 2336     Ethanol Lvl 15 mg/dL     Comprehensive metabolic panel [[id number]]  (Abnormal) Collected: 03/04/25 1742    Lab Status: Final result Specimen: Blood from Arm, Right Updated: 03/04/25 1820     Sodium 136 mmol/L      Potassium 5.5 mmol/L      Chloride 102 mmol/L      CO2 20 mmol/L      ANION GAP 14 mmol/L      BUN 7 mg/dL      Creatinine 0.56 mg/dL      Glucose 121 mg/dL      Calcium 8.7 mg/dL       U/L      ALT 88 U/L      Alkaline Phosphatase 96 U/L      Total Protein 7.8 g/dL      Albumin 4.2 g/dL      Total Bilirubin 0.93 mg/dL      eGFR 109 ml/min/1.73sq m     Narrative:      National Kidney Disease Foundation guidelines for Chronic Kidney Disease (CKD):     Stage 1 with normal or high GFR (GFR > 90 mL/min/1.73 square meters)    Stage 2 Mild CKD (GFR = 60-89 mL/min/1.73 square meters)    Stage 3A Moderate CKD (GFR = 45-59 mL/min/1.73  square meters)    Stage 3B Moderate CKD (GFR = 30-44 mL/min/1.73 square meters)    Stage 4 Severe CKD (GFR = 15-29 mL/min/1.73 square meters)    Stage 5 End Stage CKD (GFR <15 mL/min/1.73 square meters)  Note: GFR calculation is accurate only with a steady state creatinine    Protime-INR [[id number]]  (Normal) Collected: 03/04/25 1742    Lab Status: Final result Specimen: Blood from Arm, Right Updated: 03/04/25 1802     Protime 13.0 seconds      INR 0.93    Narrative:      INR Therapeutic Range    Indication                                             INR Range      Atrial Fibrillation                                               2.0-3.0  Hypercoagulable State                                    2.0.2.3  Left Ventricular Asist Device                            2.0-3.0  Mechanical Heart Valve                                  -    Aortic(with afib, MI, embolism, HF, LA enlargement,    and/or coagulopathy)                                     2.0-3.0 (2.5-3.5)     Mitral                                                             2.5-3.5  Prosthetic/Bioprosthetic Heart Valve               2.0-3.0  Venous thromboembolism (VTE: VT, PE        2.0-3.0    APTT [[id number]]  (Normal) Collected: 03/04/25 1742    Lab Status: Final result Specimen: Blood from Arm, Right Updated: 03/04/25 1802     PTT 24 seconds     CBC and differential [[id number]]  (Abnormal) Collected: 03/04/25 1742    Lab Status: Final result Specimen: Blood from Arm, Right Updated: 03/04/25 1750     WBC 8.14 Thousand/uL      RBC 5.15 Million/uL      Hemoglobin 17.4 g/dL      Hematocrit 50.1 %      MCV 97 fL      MCH 33.8 pg      MCHC 34.7 g/dL      RDW 13.4 %      MPV 9.9 fL      Platelets 206 Thousands/uL      nRBC 0 /100 WBCs      Segmented % 58 %      Immature Grans % 0 %      Lymphocytes % 35 %      Monocytes % 6 %      Eosinophils Relative 1 %      Basophils Relative 0 %      Absolute Neutrophils 4.75 Thousands/µL      Absolute Immature Grans 0.02  Thousand/uL      Absolute Lymphocytes 2.83 Thousands/µL      Absolute Monocytes 0.47 Thousand/µL      Eosinophils Absolute 0.04 Thousand/µL      Basophils Absolute 0.03 Thousands/µL             CT head without contrast   Final Interpretation by Stan Ornelsa DO (03/04 1833)      No acute intracranial abnormality.      Large extracranial hematoma superficial to the frontal bone, left greater than right extending inferiorly into the facial soft tissues.      A portion of this examination is degraded by artifact from patient's previously placed aneurysm coils.                  Workstation performed: [id number]         CT cervical spine without contrast   Final Interpretation by Stan Ornelas DO (03/04 1837)      No cervical spine fracture or traumatic malalignment.                  Workstation performed: [id number]         CT facial bones wo contrast   Final Interpretation by Stan Ornelas DO (03/04 1835)      Extracranial hematoma described in detail above with no facial fracture identified.               Workstation performed: [id number]             Procedures    ED Medication and Procedure Management   Prior to Admission Medications   Prescriptions Last Dose Informant Patient Reported? Taking?   amLODIPine (NORVASC) 5 mg tablet More than a month Self No No   Sig: Take 1 tablet (5 mg total) by mouth daily   apixaban (ELIQUIS) 5 mg More than a month Self No No   Sig: Take 1 tablet (5 mg total) by mouth 2 (two) times a day   losartan (COZAAR) 50 mg tablet More than a month Self No No   Sig: Take 1 tablet (50 mg total) by mouth daily      Facility-Administered Medications: None     Discharge Medication List as of 3/5/2025  2:50 PM        START taking these medications    Details   acetaminophen (TYLENOL) 325 mg tablet Take 2 tablets (650 mg total) by mouth every 6 (six) hours as needed for mild pain or moderate pain, Starting Wed 3/5/2025, No Print      folic acid (FOLVITE) 1 mg tablet Take  1 tablet (1 mg total) by mouth daily, Starting Thu 3/6/2025, No Print      Multiple Vitamins-Minerals (multivitamin with iron-minerals) liquid Take 5 mL by mouth daily, Starting Wed 3/5/2025, No Print      thiamine 100 MG tablet Take 1 tablet (100 mg total) by mouth daily, Starting Thu 3/6/2025, No Print           CONTINUE these medications which have NOT CHANGED    Details   amLODIPine (NORVASC) 5 mg tablet Take 1 tablet (5 mg total) by mouth daily, Starting Tue 10/15/2024, Normal      apixaban (ELIQUIS) 5 mg Take 1 tablet (5 mg total) by mouth 2 (two) times a day, Starting Fri 3/1/2024, NormalPaused since today until manually resumed      losartan (COZAAR) 50 mg tablet Take 1 tablet (50 mg total) by mouth daily, Starting Tue 10/15/2024, Normal           Outpatient Discharge Orders   Discharge Condition:  Improving     Physical Therapy Eval And Treat     Occupational Therapy Eval and Treat     Activity:  Per Rehab Recommendations     ED SEPSIS DOCUMENTATION   Time reflects when diagnosis was documented in both MDM as applicable and the Disposition within this note       Time User Action Codes Description Comment    3/4/2025  8:52 PM Noe Abel Add [W19.XXXA] Fall, initial encounter     3/4/2025  8:52 PM Noe Abel Add [H05.239] Periorbital hematoma     3/4/2025  8:52 PM Noe Abel [R62.7] Failure to thrive in adult     3/5/2025  1:11 PM Rylie Brown Add [F10.10] Alcohol abuse                  Noe Abel MD  03/13/25 1966

## 2025-04-02 ENCOUNTER — TELEPHONE (OUTPATIENT)
Age: 65
End: 2025-04-02

## 2025-04-02 NOTE — TELEPHONE ENCOUNTER
Phil from [facility] called the patient passed on 3/30/25  he is faxing over the information on the death certificate to see if Dr Queen will sign it

## 2025-04-03 NOTE — TELEPHONE ENCOUNTER
Spoke w/ home she is going to call back with more information.  Please transfer call to office clerical staff.

## 2025-04-03 NOTE — TELEPHONE ENCOUNTER
Home advised Death Certificate signed.  Chart submitted to be marked .  No further action required  Shawanda Guardado

## 2025-04-03 NOTE — TELEPHONE ENCOUNTER
Dr. Queen:    Taras from [facility] called with the following information on patient:    Death Certificate # [id number]  Date of Death: 2025  Time of Death: 3:20 PM  Cause of death - assumed heart attack